# Patient Record
Sex: FEMALE | Race: BLACK OR AFRICAN AMERICAN | NOT HISPANIC OR LATINO | Employment: OTHER | URBAN - METROPOLITAN AREA
[De-identification: names, ages, dates, MRNs, and addresses within clinical notes are randomized per-mention and may not be internally consistent; named-entity substitution may affect disease eponyms.]

---

## 2017-10-03 ENCOUNTER — TELEPHONE (OUTPATIENT)
Dept: PRIMARY CARE CLINIC | Facility: CLINIC | Age: 62
End: 2017-10-03

## 2017-10-03 NOTE — TELEPHONE ENCOUNTER
Patient having eye surgery on 10/13, asking to come to office for clearance and to have a check up done. Appointment made for 10/9. No further issues discussed.

## 2017-10-03 NOTE — TELEPHONE ENCOUNTER
----- Message from Daphnie Ornelas sent at 10/3/2017 10:26 AM CDT -----  Contact: self [phone]  Patient needs an earlier appointment to see the doctor for clearance for eye surgery. Thanks!

## 2017-10-09 ENCOUNTER — OFFICE VISIT (OUTPATIENT)
Dept: PRIMARY CARE CLINIC | Facility: CLINIC | Age: 62
End: 2017-10-09
Payer: MEDICARE

## 2017-10-09 VITALS
HEIGHT: 62 IN | WEIGHT: 286.63 LBS | DIASTOLIC BLOOD PRESSURE: 75 MMHG | TEMPERATURE: 98 F | SYSTOLIC BLOOD PRESSURE: 146 MMHG | BODY MASS INDEX: 52.74 KG/M2 | HEART RATE: 63 BPM

## 2017-10-09 DIAGNOSIS — H11.009 PTERYGIUM, UNSPECIFIED LATERALITY: ICD-10-CM

## 2017-10-09 DIAGNOSIS — F32.A DEPRESSION, UNSPECIFIED DEPRESSION TYPE: ICD-10-CM

## 2017-10-09 DIAGNOSIS — F41.9 ANXIETY: ICD-10-CM

## 2017-10-09 DIAGNOSIS — K02.9 DENTAL CARIES: ICD-10-CM

## 2017-10-09 DIAGNOSIS — R29.898 WEAKNESS OF RIGHT ARM: ICD-10-CM

## 2017-10-09 DIAGNOSIS — G47.00 INSOMNIA, UNSPECIFIED TYPE: ICD-10-CM

## 2017-10-09 DIAGNOSIS — J45.909 ASTHMA, UNSPECIFIED ASTHMA SEVERITY, UNSPECIFIED WHETHER COMPLICATED, UNSPECIFIED WHETHER PERSISTENT: ICD-10-CM

## 2017-10-09 DIAGNOSIS — R03.0 BORDERLINE HYPERTENSION: ICD-10-CM

## 2017-10-09 DIAGNOSIS — E66.9 OBESITY, UNSPECIFIED CLASSIFICATION, UNSPECIFIED OBESITY TYPE, UNSPECIFIED WHETHER SERIOUS COMORBIDITY PRESENT: ICD-10-CM

## 2017-10-09 DIAGNOSIS — M19.90 OSTEOARTHRITIS, UNSPECIFIED OSTEOARTHRITIS TYPE, UNSPECIFIED SITE: ICD-10-CM

## 2017-10-09 DIAGNOSIS — D86.9 SARCOIDOSIS: ICD-10-CM

## 2017-10-09 DIAGNOSIS — L80 VITILIGO: ICD-10-CM

## 2017-10-09 DIAGNOSIS — H25.9 AGE-RELATED CATARACT OF BOTH EYES, UNSPECIFIED AGE-RELATED CATARACT TYPE: Primary | ICD-10-CM

## 2017-10-09 PROCEDURE — 99999 PR PBB SHADOW E&M-EST. PATIENT-LVL III: CPT | Mod: PBBFAC,,, | Performed by: FAMILY MEDICINE

## 2017-10-09 PROCEDURE — 99213 OFFICE O/P EST LOW 20 MIN: CPT | Mod: PBBFAC,PN | Performed by: FAMILY MEDICINE

## 2017-10-09 PROCEDURE — 99214 OFFICE O/P EST MOD 30 MIN: CPT | Mod: S$PBB,,, | Performed by: FAMILY MEDICINE

## 2017-10-09 RX ORDER — DICLOFENAC SODIUM 75 MG/1
75 TABLET, DELAYED RELEASE ORAL 2 TIMES DAILY
Qty: 60 TABLET | Refills: 2 | Status: SHIPPED | OUTPATIENT
Start: 2017-10-09 | End: 2018-08-10

## 2017-10-09 RX ORDER — ALBUTEROL SULFATE 90 UG/1
2 AEROSOL, METERED RESPIRATORY (INHALATION) EVERY 4 HOURS PRN
Qty: 1 INHALER | Refills: 5 | Status: SHIPPED | OUTPATIENT
Start: 2017-10-09 | End: 2017-11-20 | Stop reason: SDUPTHER

## 2017-10-09 RX ORDER — SERTRALINE HYDROCHLORIDE 25 MG/1
25 TABLET, FILM COATED ORAL DAILY
Qty: 30 TABLET | Refills: 11 | Status: SHIPPED | OUTPATIENT
Start: 2017-10-09 | End: 2019-09-03

## 2017-10-09 RX ORDER — ALBUTEROL SULFATE 0.83 MG/ML
2.5 SOLUTION RESPIRATORY (INHALATION) EVERY 6 HOURS PRN
Qty: 100 EACH | Refills: 5 | Status: SHIPPED | OUTPATIENT
Start: 2017-10-09 | End: 2017-11-20 | Stop reason: SDUPTHER

## 2017-10-09 RX ORDER — TRAZODONE HYDROCHLORIDE 50 MG/1
50 TABLET ORAL NIGHTLY
Qty: 30 TABLET | Refills: 11 | Status: SHIPPED | OUTPATIENT
Start: 2017-10-09 | End: 2019-09-03

## 2017-10-09 RX ORDER — HYDROCODONE BITARTRATE AND ACETAMINOPHEN 5; 325 MG/1; MG/1
TABLET ORAL
Qty: 30 TABLET | Refills: 0 | Status: SHIPPED | OUTPATIENT
Start: 2017-10-09 | End: 2017-11-20

## 2017-10-09 NOTE — PROGRESS NOTES
Subjective:       Patient ID: Dea Newton is a 62 y.o. female.    Chief Complaint: Procedure (clearance for surgery)    HPI: 63 yo BF fell broke her arm had surgery -- Dr [facility] -- surg was in March --still going to therapy-- frozen shoulder, fxed wrist, torn ligaments= still no use right hand unable make fist. Scar right lateral elbow        Needs clearance for cataract surgery      Anxiety-- can't sleep-- nerves bothering her--always in pain with shoulder, arthritis.  Feels drained and tired all the time   ROS:  Skin: no psoriasis, eczema, skin cancer  HEENT: No headache,+ cataracts- to do left eye first --needs clearance for surgery -no  ocular pain, blurred vision, diplopia, epistaxis, hoarseness change in voice, thyroid trouble  Lung: No pneumonia, +asthma no wheezing no inhalers ,no  Tb, wheezing, SOB, no smoking   Heart: No chest pain, ankle edema, palpitations, MI, rosendo murmur,+ borderline  hypertension, hyperlipidemia  Abdomen: No nausea, vomiting, diarrhea, constipation, ulcers, hepatitis, gallbladder disease, melena, hematochezia, hematemesis  : no UTI, renal disease, stones  GYN last PAP last mammogram been awhile.   MS: no fractures, O/A, lupus, rheumatoid, gout See HPI-- hx fx right arm + sarcoidosis   Neuro: No dizziness, LOC, seizure  No diabetes, no anemia, + anxiety, + depression   Single, 9 children one , work disabled due due sarcoidosis , lives with 4 grandchildren 14 , 12, 10, 8   Objective:   Physical Exam:  General: Well nourished, well developed, no acute distress + obese   Skin: Vitiligo face  HEENT: Eyes PERRLA, EOM intact, bilat cataracts and pterygium nose patent, throat non-erythematous + dentl caries   NECK: Supple, no bruits, No JVD, no nodes  Lungs: Clear, no rales, rhonchi, wheezing  Heart: Regular rate and rhythm, no murmurs, gallops, or rubs  Abdomen: flat, bowel sounds positive, no tenderness, or organomegaly  MS: waness right hand grasp, opposition  index ans thumb,   Neuro: Alert, CN intact, oriented X 3  Extremities: No cyanosis, clubbing, or edema         Assessment:       1. Age-related cataract of both eyes, unspecified age-related cataract type    2. Pterygium, unspecified laterality    3. Asthma, unspecified asthma severity, unspecified whether complicated, unspecified whether persistent    4. Weakness of right arm    5. Insomnia, unspecified type    6. Obesity, unspecified classification, unspecified obesity type, unspecified whether serious comorbidity present    7. Sarcoidosis    8. Osteoarthritis, unspecified osteoarthritis type, unspecified site    9. Anxiety    10. Depression, unspecified depression type    11. Vitiligo    12. Dental caries    13. Borderline hypertension        Plan:       Age-related cataract of both eyes, unspecified age-related cataract type    Pterygium, unspecified laterality    Asthma, unspecified asthma severity, unspecified whether complicated, unspecified whether persistent    Weakness of right arm    Insomnia, unspecified type    Obesity, unspecified classification, unspecified obesity type, unspecified whether serious comorbidity present  -     Lipid panel; Future; Expected date: 10/09/2017    Sarcoidosis  -     TSH; Future; Expected date: 10/09/2017  -     Sedimentation rate, manual; Future; Expected date: 10/09/2017    Osteoarthritis, unspecified osteoarthritis type, unspecified site    Anxiety  -     TSH; Future; Expected date: 10/09/2017    Depression, unspecified depression type    Vitiligo  -     TSH; Future; Expected date: 10/09/2017    Dental caries    Borderline hypertension  -     CBC auto differential; Future; Expected date: 10/09/2017  -     Comprehensive metabolic panel; Future; Expected date: 10/09/2017  -     Lipid panel; Future; Expected date: 10/09/2017    Other orders  -     hydrocodone-acetaminophen 5-325mg (NORCO) 5-325 mg per tablet; 1/2 po q 6 hrs or 1 po bid prn pain  Dispense: 30 tablet; Refill:  0  -     diclofenac (VOLTAREN) 75 MG EC tablet; Take 1 tablet (75 mg total) by mouth 2 (two) times daily.  Dispense: 60 tablet; Refill: 2  -     trazodone (DESYREL) 50 MG tablet; Take 1 tablet (50 mg total) by mouth every evening.  Dispense: 30 tablet; Refill: 11  -     sertraline (ZOLOFT) 25 MG tablet; Take 1 tablet (25 mg total) by mouth once daily.  Dispense: 30 tablet; Refill: 11

## 2017-10-09 NOTE — PATIENT INSTRUCTIONS
Medically cleared for surgery   Low NA diet  [facility]  Zoloft 25 mg 1 po qd, tranxene 7.5 scored 1/2 po bid prn anxiety   Keep appt orth  for surgery right arm  Insomnia Desyrel 50 1 po q HS prn sleep. Get chest Xray and EKG reports from surgery March right arm.

## 2017-11-20 ENCOUNTER — OFFICE VISIT (OUTPATIENT)
Dept: PRIMARY CARE CLINIC | Facility: CLINIC | Age: 62
End: 2017-11-20
Payer: MEDICARE

## 2017-11-20 VITALS
HEIGHT: 60 IN | DIASTOLIC BLOOD PRESSURE: 73 MMHG | TEMPERATURE: 99 F | WEIGHT: 288 LBS | SYSTOLIC BLOOD PRESSURE: 160 MMHG | OXYGEN SATURATION: 100 % | HEART RATE: 56 BPM | RESPIRATION RATE: 18 BRPM | BODY MASS INDEX: 56.54 KG/M2

## 2017-11-20 DIAGNOSIS — L80 VITILIGO: ICD-10-CM

## 2017-11-20 DIAGNOSIS — H57.12 LEFT EYE PAIN: ICD-10-CM

## 2017-11-20 DIAGNOSIS — M19.90 OSTEOARTHRITIS, UNSPECIFIED OSTEOARTHRITIS TYPE, UNSPECIFIED SITE: ICD-10-CM

## 2017-11-20 DIAGNOSIS — F32.A DEPRESSION, UNSPECIFIED DEPRESSION TYPE: ICD-10-CM

## 2017-11-20 DIAGNOSIS — J40 BRONCHITIS: Primary | ICD-10-CM

## 2017-11-20 DIAGNOSIS — E66.9 OBESITY, UNSPECIFIED CLASSIFICATION, UNSPECIFIED OBESITY TYPE, UNSPECIFIED WHETHER SERIOUS COMORBIDITY PRESENT: ICD-10-CM

## 2017-11-20 DIAGNOSIS — G47.00 INSOMNIA, UNSPECIFIED TYPE: ICD-10-CM

## 2017-11-20 DIAGNOSIS — D86.9 SARCOIDOSIS: ICD-10-CM

## 2017-11-20 DIAGNOSIS — F41.9 ANXIETY: ICD-10-CM

## 2017-11-20 DIAGNOSIS — J45.909 ASTHMA, UNSPECIFIED ASTHMA SEVERITY, UNSPECIFIED WHETHER COMPLICATED, UNSPECIFIED WHETHER PERSISTENT: ICD-10-CM

## 2017-11-20 DIAGNOSIS — K02.9 DENTAL CARIES: ICD-10-CM

## 2017-11-20 PROCEDURE — 99214 OFFICE O/P EST MOD 30 MIN: CPT | Mod: S$PBB,,, | Performed by: FAMILY MEDICINE

## 2017-11-20 PROCEDURE — 96372 THER/PROPH/DIAG INJ SC/IM: CPT | Mod: PBBFAC,PN

## 2017-11-20 PROCEDURE — 99214 OFFICE O/P EST MOD 30 MIN: CPT | Mod: PBBFAC,PN,25 | Performed by: FAMILY MEDICINE

## 2017-11-20 PROCEDURE — 99999 PR PBB SHADOW E&M-EST. PATIENT-LVL IV: CPT | Mod: PBBFAC,,, | Performed by: FAMILY MEDICINE

## 2017-11-20 RX ORDER — HYDROCODONE BITARTRATE AND ACETAMINOPHEN 5; 325 MG/1; MG/1
TABLET ORAL
Qty: 30 TABLET | Refills: 0 | Status: SHIPPED | OUTPATIENT
Start: 2017-11-20 | End: 2018-01-29 | Stop reason: SDUPTHER

## 2017-11-20 RX ORDER — ALBUTEROL SULFATE 90 UG/1
2 AEROSOL, METERED RESPIRATORY (INHALATION) EVERY 4 HOURS PRN
Qty: 1 INHALER | Refills: 5 | Status: SHIPPED | OUTPATIENT
Start: 2017-11-20 | End: 2018-03-07

## 2017-11-20 RX ORDER — PREDNISONE 5 MG/1
TABLET ORAL
Qty: 20 TABLET | Refills: 0 | Status: SHIPPED | OUTPATIENT
Start: 2017-11-20 | End: 2018-01-29

## 2017-11-20 RX ORDER — ALBUTEROL SULFATE 0.83 MG/ML
2.5 SOLUTION RESPIRATORY (INHALATION) EVERY 6 HOURS PRN
Qty: 100 EACH | Refills: 5 | Status: SHIPPED | OUTPATIENT
Start: 2017-11-20 | End: 2018-03-07 | Stop reason: SDUPTHER

## 2017-11-20 RX ORDER — BETAMETHASONE SODIUM PHOSPHATE AND BETAMETHASONE ACETATE 3; 3 MG/ML; MG/ML
12 INJECTION, SUSPENSION INTRA-ARTICULAR; INTRALESIONAL; INTRAMUSCULAR; SOFT TISSUE
Status: COMPLETED | OUTPATIENT
Start: 2017-11-20 | End: 2017-11-20

## 2017-11-20 RX ORDER — PROMETHAZINE HYDROCHLORIDE AND CODEINE PHOSPHATE 6.25; 1 MG/5ML; MG/5ML
5 SOLUTION ORAL EVERY 6 HOURS PRN
Qty: 180 ML | Refills: 0 | Status: SHIPPED | OUTPATIENT
Start: 2017-11-20 | End: 2018-01-29

## 2017-11-20 RX ORDER — AZITHROMYCIN 250 MG/1
TABLET, FILM COATED ORAL
Qty: 6 TABLET | Refills: 0 | Status: SHIPPED | OUTPATIENT
Start: 2017-11-20 | End: 2017-11-24

## 2017-11-20 RX ADMIN — BETAMETHASONE ACETATE AND BETAMETHASONE SODIUM PHOSPHATE 12 MG: 3; 3 INJECTION, SUSPENSION INTRA-ARTICULAR; INTRALESIONAL; INTRAMUSCULAR; SOFT TISSUE at 12:11

## 2017-11-20 NOTE — PROGRESS NOTES
Subjective:       Patient ID: Dea Newton is a 62 y.o. female.    Chief Complaint: Follow-up and Medication Refill (inhaler )    HPI: 62-year-old black female complaining of left eye pain --- seen by Dr Schoenburger--3 days ago given some antibiotics .patient had cataract surgery by Dr. Titus 11/17/70   Complaining of a cold --started last Monday ×7 day --no fever , no runny nose , no sore throat , just a dry cough --no phlegm .+ asthma and has albuterol inhaler and Advair Diskus --positive wheezing .no smoke   ROS:  Skin: no psoriasis, eczema, skin cancer + vitiligo  HEENT:+ headache sinus area,no  ocular pain, blurred vision, diplopia, epistaxis, hoarseness change in voice, thyroid trouble  Lung: See history of present illness  Heart: No chest pain, ankle edema, +palpitations, no  MI, rosendo murmur, hypertension, hyperlipidemia  Abdomen: No nausea, vomiting, diarrhea, constipation, ulcers, hepatitis, gallbladder disease, melena, hematochezia, hematemesis  : no UTI, renal disease, stones  GYN no mammogram and Pap smear  MS: no fractures, +O/A,no  lupus, rheumatoid, gout--history of sarcoidosis problems with lungs and knees  Neuro: No dizziness, LOC, seizures   No diabetes, no anemia, +anxiety, + depression unable to do it used to do  Single, raising 4 grandchildren unemployed, lives alone with 4 grandchildren       Objective:   Physical Exam:  General: Well nourished, well developed, no acute distress  Skin: No lesions  HEENT: Eyes PERRLA, EOM intact slight redness to the left eye medially bilateral pterygiums no significant pain on palpation, nose clear discharge, throat +erythematous   NECK: Supple, no bruits, No JVD, no nodes  Lungs: Clear, no rales, rhonchi, wheezing  Heart: Regular rate and rhythm, no murmurs, gallops, or rubs+ Coarse cough   Abdomen: flat, bowel sounds positive, no tenderness, or organomegaly  MS: Range of motion and muscle strength intact  Neuro: Alert, CN intact, oriented X  3  Extremities: No cyanosis, clubbing, or edema         Assessment:       1. Bronchitis    2. Anxiety    3. Depression, unspecified depression type    4. Dental caries    5. Vitiligo    6. Asthma, unspecified asthma severity, unspecified whether complicated, unspecified whether persistent    7. Sarcoidosis    8. Obesity, unspecified classification, unspecified obesity type, unspecified whether serious comorbidity present    9. Osteoarthritis, unspecified osteoarthritis type, unspecified site    10. Insomnia, unspecified type    11. Left eye pain        Plan:       Bronchitis    Anxiety    Depression, unspecified depression type    Dental caries    Vitiligo    Asthma, unspecified asthma severity, unspecified whether complicated, unspecified whether persistent    Sarcoidosis    Obesity, unspecified classification, unspecified obesity type, unspecified whether serious comorbidity present    Osteoarthritis, unspecified osteoarthritis type, unspecified site    Insomnia, unspecified type    Left eye pain  -     Ambulatory Referral to Ophthalmology    Other orders  -     albuterol (PROVENTIL) 2.5 mg /3 mL (0.083 %) nebulizer solution; Take 3 mLs (2.5 mg total) by nebulization every 6 (six) hours as needed for Wheezing. Rescue  Dispense: 100 each; Refill: 5  -     albuterol 90 mcg/actuation inhaler; Inhale 2 puffs into the lungs every 4 (four) hours as needed for Wheezing or Shortness of Breath. Rescue  Dispense: 1 Inhaler; Refill: 5  -     hydrocodone-acetaminophen 5-325mg (NORCO) 5-325 mg per tablet; 1/2 po q 6 hrs or 1 po bid prn pain  Dispense: 30 tablet; Refill: 0  -     predniSONE (DELTASONE) 5 MG tablet; 4 po qd x 2, 3 po qd x2, 2 po qd x2, 1 po qd x2  Dispense: 20 tablet; Refill: 0  -     azithromycin (Z-RAJINDER) 250 MG tablet; 2 tabs by mouth day 1, then 1 tab by mouth daily x 4 days  Dispense: 6 tablet; Refill: 0  -     promethazine-codeine 6.25-10 mg/5 ml (PHENERGAN WITH CODEINE) 6.25-10 mg/5 mL syrup; Take 5 mLs by  mouth every 6 (six) hours as needed for Cough.  Dispense: 180 mL; Refill: 0  -     betamethasone acetate-betamethasone sodium phosphate injection 12 mg; Inject 2 mLs (12 mg total) into the muscle one time.

## 2018-01-29 ENCOUNTER — OFFICE VISIT (OUTPATIENT)
Dept: PRIMARY CARE CLINIC | Facility: CLINIC | Age: 63
End: 2018-01-29
Payer: MEDICARE

## 2018-01-29 VITALS
RESPIRATION RATE: 18 BRPM | HEIGHT: 61 IN | HEART RATE: 57 BPM | TEMPERATURE: 99 F | DIASTOLIC BLOOD PRESSURE: 64 MMHG | BODY MASS INDEX: 54.19 KG/M2 | WEIGHT: 287 LBS | SYSTOLIC BLOOD PRESSURE: 99 MMHG | OXYGEN SATURATION: 100 %

## 2018-01-29 DIAGNOSIS — J45.909 ASTHMA, UNSPECIFIED ASTHMA SEVERITY, UNSPECIFIED WHETHER COMPLICATED, UNSPECIFIED WHETHER PERSISTENT: ICD-10-CM

## 2018-01-29 DIAGNOSIS — F32.A DEPRESSION, UNSPECIFIED DEPRESSION TYPE: ICD-10-CM

## 2018-01-29 DIAGNOSIS — M19.90 OSTEOARTHRITIS, UNSPECIFIED OSTEOARTHRITIS TYPE, UNSPECIFIED SITE: ICD-10-CM

## 2018-01-29 DIAGNOSIS — D86.9 SARCOIDOSIS: Primary | ICD-10-CM

## 2018-01-29 DIAGNOSIS — L80 VITILIGO: ICD-10-CM

## 2018-01-29 DIAGNOSIS — F41.9 ANXIETY: ICD-10-CM

## 2018-01-29 DIAGNOSIS — K02.9 DENTAL CARIES: ICD-10-CM

## 2018-01-29 DIAGNOSIS — E66.9 OBESITY, UNSPECIFIED CLASSIFICATION, UNSPECIFIED OBESITY TYPE, UNSPECIFIED WHETHER SERIOUS COMORBIDITY PRESENT: ICD-10-CM

## 2018-01-29 LAB
CTP QC/QA: YES
FLUAV AG NPH QL: NEGATIVE
FLUBV AG NPH QL: NEGATIVE

## 2018-01-29 PROCEDURE — 99213 OFFICE O/P EST LOW 20 MIN: CPT | Mod: S$PBB,,, | Performed by: FAMILY MEDICINE

## 2018-01-29 PROCEDURE — 87804 INFLUENZA ASSAY W/OPTIC: CPT | Mod: PBBFAC,PN | Performed by: FAMILY MEDICINE

## 2018-01-29 PROCEDURE — 99213 OFFICE O/P EST LOW 20 MIN: CPT | Mod: PBBFAC,PN | Performed by: FAMILY MEDICINE

## 2018-01-29 PROCEDURE — 99999 PR PBB SHADOW E&M-EST. PATIENT-LVL III: CPT | Mod: PBBFAC,,, | Performed by: FAMILY MEDICINE

## 2018-01-29 RX ORDER — LEVOFLOXACIN 500 MG/1
500 TABLET, FILM COATED ORAL DAILY
Qty: 10 TABLET | Refills: 0 | Status: SHIPPED | OUTPATIENT
Start: 2018-01-29 | End: 2018-02-08

## 2018-01-29 RX ORDER — HYDROCODONE BITARTRATE AND ACETAMINOPHEN 5; 325 MG/1; MG/1
TABLET ORAL
Qty: 30 TABLET | Refills: 0 | Status: SHIPPED | OUTPATIENT
Start: 2018-01-29 | End: 2018-04-20 | Stop reason: SDUPTHER

## 2018-01-29 RX ORDER — METHYLPREDNISOLONE 4 MG/1
TABLET ORAL
Qty: 1 PACKAGE | Refills: 0 | Status: SHIPPED | OUTPATIENT
Start: 2018-01-29 | End: 2018-02-19

## 2018-01-29 RX ORDER — PROMETHAZINE HYDROCHLORIDE AND CODEINE PHOSPHATE 6.25; 1 MG/5ML; MG/5ML
5 SOLUTION ORAL EVERY 6 HOURS PRN
Qty: 180 ML | Refills: 0 | Status: SHIPPED | OUTPATIENT
Start: 2018-01-29 | End: 2018-04-20

## 2018-01-29 NOTE — PROGRESS NOTES
Subjective:       Patient ID: Dea Newton is a 62 y.o. female.    Chief Complaint: Generalized Body Aches (headaches SOB weak )    HPI: 62-year-old black female sick for the past 5 days body aches headaches shortness of breath and weak--no fever, + runny nose , no sore throat, + cough, no phlegm, + wheezing has inhaler.  No history of pneumonia or tuberculosis.  + Asthma only has to use the inhaler walks a lot.  No one else sick at home    ROS:  Skin: no psoriasis, eczema, skin cancer + vitiligo  HEENT: + headache-over the bridge of the nose mid chavez-orbital areas bilaterally,no  ocular pain, blurred vision, diplopia, epistaxis, hoarseness change in voice, thyroid trouble  Lung: No pneumonia, +asthma,no  Tb, +wheezing, +SOB, no smoking + sarcoid   Heart: No chest pain, ankle edema, palpitations, MI, +rosendo murmur, no  hypertension, hyperlipidemia  Abdomen: No nausea, vomiting, diarrhea, constipation, ulcers, hepatitis, gallbladder disease, melena, hematochezia, hematemesis  : no UTI, renal disease, stones  MS: no fractures, O/A, lupus, rheumatoid, gout  Neuro: No dizziness, LOC, seizures   No diabetes, no anemia, + anxiety, + depression--financial problems no way to pay bills     Objective:   Physical Exam:  General: Well nourished, well developed, no acute distress + obese   Skin: + The leg  HEENT: Eyes PERRLA, EOM intact, nose clear discharge, throat +erythematous 1/4-dental caries ears TM clear fluid   NECK: Supple, no bruits, No JVD, no nodes  Lungs: Clear, no rales, rhonchi, wheezing + coarse cough but no rales or wheezing at this time  Heart: Regular rate and rhythm, no murmurs, gallops, or rubs  Abdomen: flat, bowel sounds positive, no tenderness, or organomegaly  MS: Range of motion and muscle strength intact  Neuro: Alert, CN intact, oriented X 3  Extremities: No cyanosis, clubbing, or edema         Assessment:       1. Sarcoidosis    2. Dental caries    3. Vitiligo    4. Asthma, unspecified asthma  severity, unspecified whether complicated, unspecified whether persistent    5. Obesity, unspecified classification, unspecified obesity type, unspecified whether serious comorbidity present    6. Depression, unspecified depression type    7. Anxiety    8. Osteoarthritis, unspecified osteoarthritis type, unspecified site        Plan:       Sarcoidosis    Dental caries    Vitiligo    Asthma, unspecified asthma severity, unspecified whether complicated, unspecified whether persistent    Obesity, unspecified classification, unspecified obesity type, unspecified whether serious comorbidity present    Depression, unspecified depression type    Anxiety    Osteoarthritis, unspecified osteoarthritis type, unspecified site    Other orders  -     hydrocodone-acetaminophen 5-325mg (NORCO) 5-325 mg per tablet; 1/2 po q 6 hrs or 1 po bid prn pain  Dispense: 30 tablet; Refill: 0      November patient was treated with Zithromax Celestone prednisone Phenergan with codeine  Has cold again treat with Levaquin/Medrol/Phenergan With Codeine no steroids for at least 3 months  Flu swab

## 2018-03-07 RX ORDER — ALBUTEROL SULFATE 0.83 MG/ML
2.5 SOLUTION RESPIRATORY (INHALATION) EVERY 6 HOURS PRN
Qty: 100 EACH | Refills: 5 | OUTPATIENT
Start: 2018-03-07 | End: 2018-04-20 | Stop reason: SDUPTHER

## 2018-03-07 NOTE — TELEPHONE ENCOUNTER
----- Message from Soraya Bearden sent at 3/6/2018  1:03 PM CST -----  Contact: self   Patient need a refill on albuterol and rx for wheel chair please send to [facility], any questions please call back at [phone] (home)     [facility] [id number]  [place] - [address] AT [facility] [address] [facility] [address]  [address] [place] [address]  Phone: [phone] Fax: [phone]

## 2018-04-20 ENCOUNTER — TELEPHONE (OUTPATIENT)
Dept: PRIMARY CARE CLINIC | Facility: CLINIC | Age: 63
End: 2018-04-20

## 2018-04-20 ENCOUNTER — OFFICE VISIT (OUTPATIENT)
Dept: PRIMARY CARE CLINIC | Facility: CLINIC | Age: 63
End: 2018-04-20
Payer: MEDICARE

## 2018-04-20 VITALS
RESPIRATION RATE: 18 BRPM | WEIGHT: 287.13 LBS | OXYGEN SATURATION: 100 % | DIASTOLIC BLOOD PRESSURE: 91 MMHG | HEART RATE: 68 BPM | HEIGHT: 61 IN | TEMPERATURE: 98 F | SYSTOLIC BLOOD PRESSURE: 190 MMHG | BODY MASS INDEX: 54.21 KG/M2

## 2018-04-20 DIAGNOSIS — K02.9 DENTAL CARIES: ICD-10-CM

## 2018-04-20 DIAGNOSIS — D86.9 SARCOIDOSIS: ICD-10-CM

## 2018-04-20 DIAGNOSIS — F41.9 ANXIETY: ICD-10-CM

## 2018-04-20 DIAGNOSIS — H25.9 SENILE CATARACT, UNSPECIFIED AGE-RELATED CATARACT TYPE, UNSPECIFIED LATERALITY: ICD-10-CM

## 2018-04-20 DIAGNOSIS — E66.9 OBESITY, UNSPECIFIED CLASSIFICATION, UNSPECIFIED OBESITY TYPE, UNSPECIFIED WHETHER SERIOUS COMORBIDITY PRESENT: ICD-10-CM

## 2018-04-20 DIAGNOSIS — L80 VITILIGO: ICD-10-CM

## 2018-04-20 DIAGNOSIS — F32.A DEPRESSION, UNSPECIFIED DEPRESSION TYPE: ICD-10-CM

## 2018-04-20 DIAGNOSIS — M19.90 OSTEOARTHRITIS, UNSPECIFIED OSTEOARTHRITIS TYPE, UNSPECIFIED SITE: ICD-10-CM

## 2018-04-20 DIAGNOSIS — J45.909 ASTHMA, UNSPECIFIED ASTHMA SEVERITY, UNSPECIFIED WHETHER COMPLICATED, UNSPECIFIED WHETHER PERSISTENT: ICD-10-CM

## 2018-04-20 DIAGNOSIS — J40 BRONCHITIS: Primary | ICD-10-CM

## 2018-04-20 PROCEDURE — 99999 PR PBB SHADOW E&M-EST. PATIENT-LVL III: CPT | Mod: PBBFAC,,, | Performed by: FAMILY MEDICINE

## 2018-04-20 PROCEDURE — 99213 OFFICE O/P EST LOW 20 MIN: CPT | Mod: PBBFAC,PN | Performed by: FAMILY MEDICINE

## 2018-04-20 PROCEDURE — 99213 OFFICE O/P EST LOW 20 MIN: CPT | Mod: S$PBB,,, | Performed by: FAMILY MEDICINE

## 2018-04-20 RX ORDER — ALBUTEROL SULFATE 0.83 MG/ML
2.5 SOLUTION RESPIRATORY (INHALATION) EVERY 6 HOURS PRN
Qty: 100 EACH | Refills: 5 | Status: SHIPPED | OUTPATIENT
Start: 2018-04-20 | End: 2018-08-10 | Stop reason: SDUPTHER

## 2018-04-20 RX ORDER — METHYLPREDNISOLONE 4 MG/1
TABLET ORAL
Qty: 1 PACKAGE | Refills: 0 | Status: SHIPPED | OUTPATIENT
Start: 2018-04-20 | End: 2018-05-11

## 2018-04-20 RX ORDER — AZITHROMYCIN 250 MG/1
TABLET, FILM COATED ORAL
Qty: 6 TABLET | Refills: 0 | Status: SHIPPED | OUTPATIENT
Start: 2018-04-20 | End: 2018-04-24

## 2018-04-20 RX ORDER — HYDROCODONE BITARTRATE AND ACETAMINOPHEN 5; 325 MG/1; MG/1
TABLET ORAL
Qty: 30 TABLET | Refills: 0 | Status: SHIPPED | OUTPATIENT
Start: 2018-04-20 | End: 2018-07-09 | Stop reason: SDUPTHER

## 2018-04-20 RX ORDER — PROMETHAZINE HYDROCHLORIDE AND CODEINE PHOSPHATE 6.25; 1 MG/5ML; MG/5ML
5 SOLUTION ORAL EVERY 6 HOURS PRN
Qty: 180 ML | Refills: 0 | Status: SHIPPED | OUTPATIENT
Start: 2018-04-20 | End: 2018-07-09

## 2018-04-20 NOTE — PROGRESS NOTES
Subjective:       Patient ID: Dea Newton is a 62 y.o. female.    Chief Complaint: Sore Throat; Cough; Headache; and Medication Refill (albuterol solution for nebulizer, norco and order for walker with seat)    HPI: 62-year-old female in for cold--no fever, no runny nose, + sore throat, + cough, + phlegm green--clear to.  + History of asthma no pneumonia or TB.  Has nebulizer albuterol, and albuterol-Ventolin inhaler.  Uses Ventolin 2-3 times per day        Impression concussion T to have a family member lives with her--- patient lives alone--on section 8 program.     ROS:  Skin: no psoriasis, eczema, skin cancer + vitiligo  HEENT: + headache,no  ocular pain, blurred vision, diplopia, epistaxis, hoarseness change in voice, thyroid trouble + dental caries is getting them repair, had left cataract removed still has right cataract to be removed  Lung: No pneumonia,Tb, wheezing, SOB, + asthma, + sarcoidosis  Heart: No chest pain, ankle edema, +palpitations no , MI, rosendo murmur, hypertension, hyperlipidemia  Abdomen: No nausea, vomiting, diarrhea, constipation, ulcers, hepatitis, gallbladder disease, melena, hematochezia, hematemesis  : no UTI, renal disease, stones  GYN negative  MS: no fractures, +O/A- knees bilaterally, ankles bilaterally, lumbar spine,no  lupus, rheumatoid, gout--fractured right arm last year had surgery   Neuro: No dizziness, LOC, seizures   No diabetes, no anemia, no anxiety, + depression  ,, 8 children, lives alone raising her 4 grandkids--patient has crusted and 4 grandchildren so able to stay with but needs permission if the person is not on her least to have another adult stay with her     Objective:   Physical Exam:  General: Well nourished, well developed, no acute distress + obese   Skin: + Vitiligo  HEENT: Eyes PERRLA, EOM intact, nose + clear discharge, throat +1/4 erythematous--poor dentition multiple voids ears TM clear  NECK: Supple, no bruits, No JVD, no nodes  Lungs:  Clear, no rales, rhonchi, wheezing + coarse cough  Heart: Regular rate and rhythm, no murmurs, gallops, or rubs  Abdomen: flat, bowel sounds positive, no tenderness, or organomegaly  MS: Range of motion and muscle strength intact  Neuro: Alert, CN intact, oriented X 3  Extremities: No cyanosis, clubbing, or edema         Assessment:       1. Bronchitis    2. Vitiligo    3. Asthma, unspecified asthma severity, unspecified whether complicated, unspecified whether persistent    4. Sarcoidosis    5. Obesity, unspecified classification, unspecified obesity type, unspecified whether serious comorbidity present    6. Osteoarthritis, unspecified osteoarthritis type, unspecified site    7. Dental caries    8. Depression, unspecified depression type    9. Anxiety    10. Senile cataract, unspecified age-related cataract type, unspecified laterality        Plan:       Bronchitis    Vitiligo    Asthma, unspecified asthma severity, unspecified whether complicated, unspecified whether persistent    Sarcoidosis    Obesity, unspecified classification, unspecified obesity type, unspecified whether serious comorbidity present    Osteoarthritis, unspecified osteoarthritis type, unspecified site    Dental caries    Depression, unspecified depression type    Anxiety    Senile cataract, unspecified age-related cataract type, unspecified laterality    Other orders  -     hydrocodone-acetaminophen 5-325mg (NORCO) 5-325 mg per tablet; 1/2 po q 6 hrs or 1 po bid prn pain  Dispense: 30 tablet; Refill: 0  -     albuterol (PROVENTIL) 2.5 mg /3 mL (0.083 %) nebulizer solution; Take 3 mLs (2.5 mg total) by nebulization every 6 (six) hours as needed for Wheezing. Rescue  Dispense: 100 each; Refill: 5  -     promethazine-codeine 6.25-10 mg/5 ml (PHENERGAN WITH CODEINE) 6.25-10 mg/5 mL syrup; Take 5 mLs by mouth every 6 (six) hours as needed for Cough.  Dispense: 180 mL; Refill: 0  -     azithromycin (Z-RAJINDER) 250 MG tablet; 2 tabs by mouth day 1, then  1 tab by mouth daily x 4 days  Dispense: 6 tablet; Refill: 0  -     methylPREDNISolone (MEDROL DOSEPACK) 4 mg tablet; use as directed  Dispense: 1 Package; Refill: 0      Zithromax/Medrol/Phenergan With Codeine for cold  Needs routine labs as outlined in the computer also needs chest x-ray EKG  Given note needs some assistance with her living due to obesity/osteoarthritis/asthma/sarcoid  Patient wants a walker with a seat  Needs refill of albuterol inhaler and aerosol nebulizer-solution

## 2018-04-20 NOTE — TELEPHONE ENCOUNTER
----- Message from Haritha Crespo sent at 4/20/2018  9:06 AM CDT -----  Type:  Sooner Apoointment Request       Name of Caller:  Dea  When is the first available appointment?  Today at 12:15   Symptoms:  Headache  Best Call Back Number:  [phone] (home)     Additional Information:  states she will come in now and wait

## 2018-05-16 ENCOUNTER — TELEPHONE (OUTPATIENT)
Dept: PRIMARY CARE CLINIC | Facility: CLINIC | Age: 63
End: 2018-05-16

## 2018-05-16 NOTE — TELEPHONE ENCOUNTER
----- Message from Haritha Kelsey sent at 5/16/2018 10:35 AM CDT -----  Contact: Patient  Type: Needs Medical Advice    Who Called:  Dea patient   Symptoms (please be specific):  N/A  How long has patient had these symptoms:  N/A  Pharmacy name and phone #:  N/A  Best Call Back Number: [phone]  Additional Information: Calling to inquire about [employer]. Please call her. Thanks.

## 2018-07-02 ENCOUNTER — NURSE TRIAGE (OUTPATIENT)
Dept: ADMINISTRATIVE | Facility: CLINIC | Age: 63
End: 2018-07-02

## 2018-07-02 ENCOUNTER — TELEPHONE (OUTPATIENT)
Dept: PRIMARY CARE CLINIC | Facility: CLINIC | Age: 63
End: 2018-07-02

## 2018-07-02 NOTE — TELEPHONE ENCOUNTER
----- Message from Nimisha Williamson sent at 7/2/2018 10:15 AM CDT -----  Contact: Patient  Dea, [phone] called to schedule an appointment for headache, weakness, shortness of breath, and high blood pressure. Advised patient I cannot schedule for those symptoms, transferred to [facility] on call nurse. Please advise. Thanks.

## 2018-07-09 ENCOUNTER — OFFICE VISIT (OUTPATIENT)
Dept: PRIMARY CARE CLINIC | Facility: CLINIC | Age: 63
End: 2018-07-09
Payer: MEDICARE

## 2018-07-09 VITALS
OXYGEN SATURATION: 100 % | RESPIRATION RATE: 18 BRPM | TEMPERATURE: 98 F | HEART RATE: 55 BPM | SYSTOLIC BLOOD PRESSURE: 162 MMHG | BODY MASS INDEX: 54.75 KG/M2 | WEIGHT: 290 LBS | HEIGHT: 61 IN | DIASTOLIC BLOOD PRESSURE: 71 MMHG

## 2018-07-09 DIAGNOSIS — F32.A DEPRESSION, UNSPECIFIED DEPRESSION TYPE: ICD-10-CM

## 2018-07-09 DIAGNOSIS — R60.0 PERIPHERAL EDEMA: Primary | ICD-10-CM

## 2018-07-09 DIAGNOSIS — J45.909 ASTHMA, UNSPECIFIED ASTHMA SEVERITY, UNSPECIFIED WHETHER COMPLICATED, UNSPECIFIED WHETHER PERSISTENT: ICD-10-CM

## 2018-07-09 DIAGNOSIS — D86.9 SARCOIDOSIS: ICD-10-CM

## 2018-07-09 DIAGNOSIS — L80 VITILIGO: ICD-10-CM

## 2018-07-09 DIAGNOSIS — I10 ESSENTIAL HYPERTENSION: ICD-10-CM

## 2018-07-09 DIAGNOSIS — M19.90 OSTEOARTHRITIS, UNSPECIFIED OSTEOARTHRITIS TYPE, UNSPECIFIED SITE: ICD-10-CM

## 2018-07-09 DIAGNOSIS — F41.9 ANXIETY: ICD-10-CM

## 2018-07-09 DIAGNOSIS — R29.898 WEAKNESS OF RIGHT ARM: ICD-10-CM

## 2018-07-09 DIAGNOSIS — G47.00 INSOMNIA, UNSPECIFIED TYPE: ICD-10-CM

## 2018-07-09 DIAGNOSIS — K02.9 DENTAL CARIES: ICD-10-CM

## 2018-07-09 DIAGNOSIS — E66.9 OBESITY, UNSPECIFIED CLASSIFICATION, UNSPECIFIED OBESITY TYPE, UNSPECIFIED WHETHER SERIOUS COMORBIDITY PRESENT: ICD-10-CM

## 2018-07-09 PROCEDURE — 99999 PR PBB SHADOW E&M-EST. PATIENT-LVL IV: CPT | Mod: PBBFAC,,, | Performed by: FAMILY MEDICINE

## 2018-07-09 PROCEDURE — 99214 OFFICE O/P EST MOD 30 MIN: CPT | Mod: S$PBB,ICN,, | Performed by: FAMILY MEDICINE

## 2018-07-09 PROCEDURE — 99214 OFFICE O/P EST MOD 30 MIN: CPT | Mod: PBBFAC,PN | Performed by: FAMILY MEDICINE

## 2018-07-09 RX ORDER — FUROSEMIDE 40 MG/1
40 TABLET ORAL DAILY
Qty: 30 TABLET | Refills: 5 | Status: SHIPPED | OUTPATIENT
Start: 2018-07-09 | End: 2018-08-10 | Stop reason: SDUPTHER

## 2018-07-09 RX ORDER — HYDROCODONE BITARTRATE AND ACETAMINOPHEN 5; 325 MG/1; MG/1
TABLET ORAL
Qty: 30 TABLET | Refills: 0 | Status: SHIPPED | OUTPATIENT
Start: 2018-07-09 | End: 2018-08-10

## 2018-07-09 NOTE — PROGRESS NOTES
Subjective:       Patient ID: Dea Newton is a 62 y.o. female.    Chief Complaint: Headache (bodyaches dizzy swelling )    HPI: 62-year-old female playing of headaches dizziness swelling and body aches.  Patient went to the emergency room 2-3 weeks ago--patient was given pressure medications patient supposed to be on 2 blood pressure pills amlodipine and hydrochlorothiazide--blood pressure 173/75 repeated 162/71--but patient out of amlodipine--ran out a few days ago.       Headaches in the frontal area--daughter feels secondary to tension patient is moving.  Patient had to move out of her house and does not have another how she does needs papers filled out--lower living in.  Patient states needs a limited because not able to do anything for self--- patient fractured her right humerus and has a frozen shoulder so unable to clean sweep mop.  Also feeling dizzy feels probably secondary to being out of blood pressure medications    ROS:  Skin: no psoriasis, eczema, skin cancer + vitiligo  HEENT: + headache-see history of present illness,no  ocular pain, blurred vision, diplopia, epistaxis, hoarseness change in voice, thyroid trouble + dental caries --states no medication to repair teeth , had left cataract removed still has right cataract to be removed  Lung: No pneumonia,Tb, wheezing, SOB, + asthma, + sarcoidosis--does get some shortness of breath  Heart: No chest pain,+ ankle edema, +palpitations no MI, rosendo murmur, hypertension, hyperlipidemia  Abdomen: No nausea, vomiting, diarrhea, constipation, ulcers, hepatitis, gallbladder disease, melena, hematochezia, hematemesis  : no UTI, renal disease, stones  GYN negative  MS: no fractures, +O/A- knees bilaterally, ankles bilaterally, history of a fractured right humerus with frozen shoulders hard to abduct the arm had surgery unable to abduct arm  more than 70° unable raise arms overhead lumbar spine,no  lupus, rheumatoid, gout  Neuro: + dizziness, LOC, seizures   No  diabetes, no anemia, + anxiety, + depression  ,, 8 children, lives alone raising her 4 grandkids--patient has   4 grandchildren so able to stay with but needs permission if the person is not on her list  to have another adult stay with her     Objective:   Physical Exam:  General: Well nourished, well developed, no acute distress + obese   Skin: + Vitiligo  HEENT: Eyes PERRLA, EOM intact, nose + clear discharge, throat +1/4 erythematous--poor dentition multiple voids ears TM clear  NECK: Supple, no bruits, No JVD, no nodes  Lungs: Clear, no rales, rhonchi, wheezing + coarse cough  Heart: Regular rate and rhythm, no murmurs, gallops, or rubs  Abdomen: flat, bowel sounds positive, no tenderness, or organomegaly  MS: Unable to abduct the arm greater than 70°, unable to bring arms overhead unable to do circular motions with arm due to a frozen shoulder, problems with both knees able to squat only FDC down pain in the knees and ankles with walking any significant distance  Neuro: Alert, CN intact, oriented X 3  Extremities: No cyanosis, clubbing, + 2/4 pitting edema         Assessment:       1. Peripheral edema    2. Obesity, unspecified classification, unspecified obesity type, unspecified whether serious comorbidity present    3. Sarcoidosis    4. Asthma, unspecified asthma severity, unspecified whether complicated, unspecified whether persistent    5. Osteoarthritis, unspecified osteoarthritis type, unspecified site    6. Weakness of right arm    7. Insomnia, unspecified type    8. Vitiligo    9. Dental caries    10. Anxiety    11. Depression, unspecified depression type        Plan:         Needs routine labs as outlined in the computer also needs chest x-ray EKG  Given note needs some assistance with her living due to obesity/osteoarthritis--history of surgical repair of a fractured right humerus with frozen shoulder rhinitis in the knees bilaterally arthritis ankles bilaterally/asthma/sarcoid--form  filled out this visit  Patient wants a walker with a seat  Patient will be on Lasix 40 mg 1 by mouth every morning DC hydrochlorothiazide fill amlodipine 5 mg 1 by mouth daily patient to come in when back on amlodipine see his blood pressure stable if not we'll add losartan  Needs to be on low-sodium diet/exercise as tolerated/decreased weight  Sarcoid appears to be stable  Needs refill of albuterol inhaler and aerosol nebulizer-solution  Needs  to see ophthalmologist to have right cataract removal

## 2018-07-09 NOTE — TELEPHONE ENCOUNTER
----- Message from Maddy Lee sent at 7/9/2018  3:13 PM CDT -----  Contact: Leola pete/[facility] [phone]  They have not received the prescription for the amlodipine.  Will you please send it.  Thank you!

## 2018-07-11 RX ORDER — AMLODIPINE BESYLATE 5 MG/1
10 TABLET ORAL DAILY
Qty: 30 TABLET | Refills: 5 | OUTPATIENT
Start: 2018-07-11 | End: 2018-08-10 | Stop reason: SDUPTHER

## 2018-08-10 ENCOUNTER — OFFICE VISIT (OUTPATIENT)
Dept: PRIMARY CARE CLINIC | Facility: CLINIC | Age: 63
End: 2018-08-10
Payer: MEDICARE

## 2018-08-10 VITALS
BODY MASS INDEX: 54.75 KG/M2 | OXYGEN SATURATION: 100 % | RESPIRATION RATE: 18 BRPM | DIASTOLIC BLOOD PRESSURE: 79 MMHG | HEIGHT: 61 IN | HEART RATE: 53 BPM | WEIGHT: 290 LBS | SYSTOLIC BLOOD PRESSURE: 144 MMHG

## 2018-08-10 DIAGNOSIS — H11.009 PTERYGIUM, UNSPECIFIED LATERALITY: ICD-10-CM

## 2018-08-10 DIAGNOSIS — F32.A DEPRESSION, UNSPECIFIED DEPRESSION TYPE: ICD-10-CM

## 2018-08-10 DIAGNOSIS — J45.909 ASTHMA, UNSPECIFIED ASTHMA SEVERITY, UNSPECIFIED WHETHER COMPLICATED, UNSPECIFIED WHETHER PERSISTENT: ICD-10-CM

## 2018-08-10 DIAGNOSIS — M25.572 BILATERAL ANKLE PAIN, UNSPECIFIED CHRONICITY: ICD-10-CM

## 2018-08-10 DIAGNOSIS — M25.571 BILATERAL ANKLE PAIN, UNSPECIFIED CHRONICITY: ICD-10-CM

## 2018-08-10 DIAGNOSIS — I10 ESSENTIAL HYPERTENSION: ICD-10-CM

## 2018-08-10 DIAGNOSIS — K02.9 DENTAL CARIES: ICD-10-CM

## 2018-08-10 DIAGNOSIS — F41.9 ANXIETY: ICD-10-CM

## 2018-08-10 DIAGNOSIS — L80 VITILIGO: ICD-10-CM

## 2018-08-10 DIAGNOSIS — H25.9 SENILE CATARACT, UNSPECIFIED AGE-RELATED CATARACT TYPE, UNSPECIFIED LATERALITY: ICD-10-CM

## 2018-08-10 DIAGNOSIS — M25.562 PAIN IN BOTH KNEES, UNSPECIFIED CHRONICITY: ICD-10-CM

## 2018-08-10 DIAGNOSIS — M19.90 OSTEOARTHRITIS, UNSPECIFIED OSTEOARTHRITIS TYPE, UNSPECIFIED SITE: Primary | ICD-10-CM

## 2018-08-10 DIAGNOSIS — E66.9 OBESITY, UNSPECIFIED CLASSIFICATION, UNSPECIFIED OBESITY TYPE, UNSPECIFIED WHETHER SERIOUS COMORBIDITY PRESENT: ICD-10-CM

## 2018-08-10 DIAGNOSIS — D86.9 SARCOIDOSIS: ICD-10-CM

## 2018-08-10 DIAGNOSIS — M25.561 PAIN IN BOTH KNEES, UNSPECIFIED CHRONICITY: ICD-10-CM

## 2018-08-10 DIAGNOSIS — M75.01 ADHESIVE CAPSULITIS OF RIGHT SHOULDER: ICD-10-CM

## 2018-08-10 PROCEDURE — 99213 OFFICE O/P EST LOW 20 MIN: CPT | Mod: S$PBB,,, | Performed by: FAMILY MEDICINE

## 2018-08-10 PROCEDURE — 99999 PR PBB SHADOW E&M-EST. PATIENT-LVL V: CPT | Mod: PBBFAC,,, | Performed by: FAMILY MEDICINE

## 2018-08-10 PROCEDURE — 99215 OFFICE O/P EST HI 40 MIN: CPT | Mod: PBBFAC,PN,25 | Performed by: FAMILY MEDICINE

## 2018-08-10 PROCEDURE — 96372 THER/PROPH/DIAG INJ SC/IM: CPT | Mod: PBBFAC,PN

## 2018-08-10 RX ORDER — TRAMADOL HYDROCHLORIDE 50 MG/1
50 TABLET ORAL EVERY 6 HOURS PRN
Qty: 30 TABLET | Refills: 0 | Status: SHIPPED | OUTPATIENT
Start: 2018-08-10 | End: 2018-08-20

## 2018-08-10 RX ORDER — AMLODIPINE BESYLATE 5 MG/1
10 TABLET ORAL DAILY
Qty: 30 TABLET | Refills: 5 | Status: SHIPPED | OUTPATIENT
Start: 2018-08-10 | End: 2019-09-03

## 2018-08-10 RX ORDER — IBUPROFEN 600 MG/1
600 TABLET ORAL 3 TIMES DAILY
Qty: 60 TABLET | Refills: 5 | Status: SHIPPED | OUTPATIENT
Start: 2018-08-10 | End: 2019-09-03

## 2018-08-10 RX ORDER — METHYLPREDNISOLONE 4 MG/1
TABLET ORAL
Qty: 1 PACKAGE | Refills: 0 | Status: SHIPPED | OUTPATIENT
Start: 2018-08-10 | End: 2018-08-31

## 2018-08-10 RX ORDER — FUROSEMIDE 40 MG/1
40 TABLET ORAL DAILY
Qty: 30 TABLET | Refills: 5 | Status: SHIPPED | OUTPATIENT
Start: 2018-08-10 | End: 2019-09-03

## 2018-08-10 RX ORDER — BETAMETHASONE SODIUM PHOSPHATE AND BETAMETHASONE ACETATE 3; 3 MG/ML; MG/ML
12 INJECTION, SUSPENSION INTRA-ARTICULAR; INTRALESIONAL; INTRAMUSCULAR; SOFT TISSUE
Status: COMPLETED | OUTPATIENT
Start: 2018-08-10 | End: 2018-08-10

## 2018-08-10 RX ORDER — ALBUTEROL SULFATE 0.83 MG/ML
2.5 SOLUTION RESPIRATORY (INHALATION) EVERY 6 HOURS PRN
Qty: 100 EACH | Refills: 5 | Status: SHIPPED | OUTPATIENT
Start: 2018-08-10 | End: 2022-01-10

## 2018-08-10 RX ADMIN — BETAMETHASONE SODIUM PHOSPHATE AND BETAMETHASONE ACETATE 12 MG: 3; 3 INJECTION, SUSPENSION INTRA-ARTICULAR; INTRALESIONAL; INTRAMUSCULAR at 01:08

## 2018-08-10 NOTE — PROGRESS NOTES
Subjective:       Patient ID: Dea Newton is a 62 y.o. female.    Chief Complaint: Medication Problem (BP meds causing dizziness); Foot Swelling; and Shortness of Breath (fatigue )    HPI:  62-year-old female in for problems with blood pressure, foot swelling and pain in the plantar aspect especially near the heel, shortness of breath, fatigue.      Complaining unable to tolerate the pain----since fractured right arm shoulder is frozen--over a year now--not seeing an orthopedist, pain in the knee, and sore the bottom of the left foot--  Has gets up stiff all the time.       Blood pressure appears to be stable now at 144/79 did not take blood pressure medicines yet this morning still blood pressure is markedly improved from last visit       Ft swelling--ankle swells and has pain bilaterally left greater than right    Office visit 07/09/2018-- 62-year-old female playing of headaches dizziness swelling and body aches.  Patient went to the emergency room 2-3 weeks ago--patient was given pressure medications patient supposed to be on 2 blood pressure pills amlodipine and hydrochlorothiazide--blood pressure 173/75 repeated 162/71--but patient out of amlodipine--ran out a few days ago.       Headaches in the frontal area--daughter feels secondary to tension patient is moving.  Patient had to move out of her house and does not have another how she does needs papers filled out--lower living in.  Patient states needs a limited because not able to do anything for self--- patient fractured her right humerus and has a frozen shoulder so unable to clean sweep mop.  Also feeling dizzy feels probably secondary to being out of blood pressure medications    ROS:  Skin: no psoriasis, eczema, skin cancer + vitiligo  HEENT: + headache--not sure pressure her tension or stress--no  ocular pain, blurred vision, diplopia, epistaxis, hoarseness change in voice, thyroid trouble + dental caries --states no medication to repair teeth , had  left cataract removed still has right cataract to be removed--did not see dentist and was not able scheduled surgery for cataract  Lung: No pneumonia, frontal area?   Tb, wheezing, SOB, + asthma, + sarcoidosis--does get some shortness of breath  Heart: No chest pain,+ ankle edema, +palpitations no MI, rosendo murmur, hypertension, hyperlipidemia  Abdomen: No nausea, vomiting, diarrhea, constipation, ulcers, hepatitis, gallbladder disease, melena, hematochezia, hematemesis  : no UTI, renal disease, stones  GYN negative  MS: no fractures, +O/A- knees bilaterally, ankles bilaterally, history of a fractured right humerus with frozen shoulders hard to abduct the arm had surgery unable to abduct arm  more than 70° unable raise arms overhead lumbar spine,no  lupus, rheumatoid, gout---biggest pain appears to be now on the left ankle and plantar surface of the foot--the pain in the right shoulder right ankle persistent stiffness when resting and then beginning to walk or move--due to knee ankle and shoulder pain  Neuro: + dizziness, LOC, seizures   No diabetes, no anemia, + anxiety, + depression  ,, 8 children, lives alone raising her 4 grandkids--patient has   4 grandchildren so able to stay with but needs permission if the person is not on her list  to have another adult stay with her     Objective:   Physical Exam:  General: Well nourished, well developed, no acute distress + obese   Skin: + Vitiligo  HEENT: Eyes PERRLA, EOM intact, nose - clear , throat nonerythematous--poor dentition multiple voids ears TM clear  NECK: Supple, no bruits, No JVD, no nodes  Lungs: Clear, no rales, rhonchi, wheezing   Heart: Regular rate and rhythm, no murmurs, gallops, or rubs  Abdomen: flat, bowel sounds positive, no tenderness, or organomegaly  MS: Unable to abduct the arm greater than 70°, unable to bring arms overhead unable to do circular motions with arm due to a frozen shoulder, problems with both knees able to squat only  MCC down pain in the knees and ankles with walking any significant distance---both ankles swollen left greater than right pain in the left ankle with palpation flexion extension inversion eversion but bilateral peripheral edema  Neuro: Alert, CN intact, oriented X 3  Extremities: No cyanosis, clubbing, + 1/4 pitting edema but did not take blood pressure are fluid pills yet        Assessment:       No diagnosis found.    Plan:         Needs routine labs as outlined in the computer also needs chest x-ray EKG  Given note needs some assistance with her living due to obesity/osteoarthritis--history of surgical repair of a fractured right humerus with frozen shoulder rhinitis in the knees bilaterally arthritis ankles bilaterally/asthma/sarcoid--form filled out this visit  Patient wants a walker with a seat  Patient will be on Lasix 40 mg 1 by mouth every morning DC hydrochlorothiazide fill amlodipine 5 mg 1 by mouth daily patient to come in when back on amlodipine see his blood pressure stable if not we'll add losartan  Needs to be on low-sodium diet/exercise as tolerated/decreased weight  Sarcoid appears to be stable  Needs refill of albuterol inhaler and aerosol nebulizer-solution  Needs  to see ophthalmologist to have right cataract removal

## 2018-08-10 NOTE — PROGRESS NOTES
Patient verified by name and . 12 mg betamethasone given im to left vent gluteal, using aseptic technique. Patient tolerated well/

## 2018-08-24 ENCOUNTER — TELEPHONE (OUTPATIENT)
Dept: OPHTHALMOLOGY | Facility: CLINIC | Age: 63
End: 2018-08-24

## 2018-08-28 ENCOUNTER — TELEPHONE (OUTPATIENT)
Dept: ORTHOPEDICS | Facility: CLINIC | Age: 63
End: 2018-08-28

## 2018-08-28 NOTE — TELEPHONE ENCOUNTER
Left call back message. Calling patient to determine if patient needs to be seen by ortho and/or pain management r/t referral in system.

## 2018-08-28 NOTE — TELEPHONE ENCOUNTER
----- Message from Omkar Lam sent at 8/27/2018  9:57 AM CDT -----  Contact: self   Pt is requesting a call back regarding an appt for her foot. Pt can be reached at [phone].

## 2018-09-12 ENCOUNTER — OFFICE VISIT (OUTPATIENT)
Dept: PRIMARY CARE CLINIC | Facility: CLINIC | Age: 63
End: 2018-09-12
Payer: MEDICARE

## 2018-09-12 VITALS
HEART RATE: 51 BPM | OXYGEN SATURATION: 100 % | DIASTOLIC BLOOD PRESSURE: 76 MMHG | RESPIRATION RATE: 18 BRPM | BODY MASS INDEX: 54.56 KG/M2 | WEIGHT: 289 LBS | HEIGHT: 61 IN | SYSTOLIC BLOOD PRESSURE: 172 MMHG

## 2018-09-12 DIAGNOSIS — I10 HYPERTENSION, UNSPECIFIED TYPE: Primary | ICD-10-CM

## 2018-09-12 DIAGNOSIS — M19.90 OSTEOARTHRITIS, UNSPECIFIED OSTEOARTHRITIS TYPE, UNSPECIFIED SITE: ICD-10-CM

## 2018-09-12 DIAGNOSIS — R51.9 NONINTRACTABLE HEADACHE, UNSPECIFIED CHRONICITY PATTERN, UNSPECIFIED HEADACHE TYPE: ICD-10-CM

## 2018-09-12 DIAGNOSIS — L80 VITILIGO: ICD-10-CM

## 2018-09-12 DIAGNOSIS — H25.9 SENILE CATARACT, UNSPECIFIED AGE-RELATED CATARACT TYPE, UNSPECIFIED LATERALITY: ICD-10-CM

## 2018-09-12 DIAGNOSIS — F32.A DEPRESSION, UNSPECIFIED DEPRESSION TYPE: ICD-10-CM

## 2018-09-12 DIAGNOSIS — D86.9 SARCOIDOSIS: ICD-10-CM

## 2018-09-12 DIAGNOSIS — K02.9 DENTAL CARIES: ICD-10-CM

## 2018-09-12 DIAGNOSIS — E66.9 OBESITY, UNSPECIFIED CLASSIFICATION, UNSPECIFIED OBESITY TYPE, UNSPECIFIED WHETHER SERIOUS COMORBIDITY PRESENT: ICD-10-CM

## 2018-09-12 DIAGNOSIS — J45.909 ASTHMA, UNSPECIFIED ASTHMA SEVERITY, UNSPECIFIED WHETHER COMPLICATED, UNSPECIFIED WHETHER PERSISTENT: ICD-10-CM

## 2018-09-12 DIAGNOSIS — F41.9 ANXIETY: ICD-10-CM

## 2018-09-12 PROCEDURE — 99999 PR PBB SHADOW E&M-EST. PATIENT-LVL III: CPT | Mod: PBBFAC,,, | Performed by: FAMILY MEDICINE

## 2018-09-12 PROCEDURE — 99213 OFFICE O/P EST LOW 20 MIN: CPT | Mod: S$PBB,,, | Performed by: FAMILY MEDICINE

## 2018-09-12 PROCEDURE — 99213 OFFICE O/P EST LOW 20 MIN: CPT | Mod: PBBFAC,PN | Performed by: FAMILY MEDICINE

## 2018-09-12 RX ORDER — LOSARTAN POTASSIUM 50 MG/1
50 TABLET ORAL DAILY
Qty: 90 TABLET | Refills: 3 | Status: SHIPPED | OUTPATIENT
Start: 2018-09-12 | End: 2019-09-03 | Stop reason: ALTCHOICE

## 2018-09-12 NOTE — PROGRESS NOTES
Subjective:       Patient ID: Dea Newton is a 63 y.o. female.    Chief Complaint: Headache (sinus )    HPI:  63-year-old female in for blood pressure medications making her sick--patient is on amlodipine---patient states this feels bad when takesit.  Stops at 3-4 days ago blood pressure 172/76.       Patient also complaining of headaches for the last 2-3 days--+sinus congestion--no fever, pressure sensation in the bridge of the nose no runny nose, no sore throat, no cough.      63-year-old female Office visit 08/10/2018   62-year-old female in for problems with blood pressure, foot swelling and pain in the plantar aspect especially near the heel, shortness of breath, fatigue.      Complaining unable to tolerate the pain----since fractured right arm shoulder is frozen--over a year now--not seeing an orthopedist, pain in the knee, and sore the bottom of the left foot--  Has gets up stiff all the time.       Blood pressure appears to be stable now at 144/79 did not take blood pressure medicines yet this morning still blood pressure is markedly improved from last visit       Ft swelling--ankle swells and has pain bilaterally left greater than right    ROS:  Skin: no psoriasis, eczema, skin cancer + vitiligo  HEENT: + headache-,--no  ocular pain, blurred vision, diplopia, epistaxis, hoarseness change in voice, thyroid trouble + dental caries --has appoint with the dentist.  Had left cataract removed scheduled to have an appointment for possible right cataract  Lung: No pneumonia, frontal area?   Tb, wheezing, SOB, + asthma, + sarcoidosis--does get some shortness of breath--occasional wheezing at night--has albuterol does help  Heart: No chest pain,+ ankle edema, no palpitations no MI, rosendo murmur+ hypertension,no  hyperlipidemia  Abdomen: No nausea, vomiting, diarrhea, constipation, ulcers, hepatitis, gallbladder disease, melena, hematochezia, hematemesis  : no UTI, renal disease, stones  GYN negative  MS: no  fractures, +O/A- knees bilaterally, ankles bilaterally, history of a fractured right humerus with frozen shoulders hard to abduct the arm had surgery unable to abduct arm  more than 70° unable raise arms overhead lumbar spine,no  lupus, rheumatoid, gout---biggest pain appears to be now on the left ankle and plantar surface of the foot--the pain in the right shoulder right ankle persistent stiffness when resting and then beginning to walk or move--due to knee ankle and shoulder pain--no significant change--has appoint with orthopedist and of October    Neuro: + dizziness-last 2 day, LOC, seizures   No diabetes, no anemia, + anxiety, + depression  ,, 8 children, lives alone raising her 4 grandkids--patient has   4 grandchildren so able to stay with but needs permission if the person is not on her list  to have another adult stay with her     Objective:   Physical Exam:  General: Well nourished, well developed, no acute distress + obese   Skin: + Vitiligo  HEENT: Eyes PERRLA, EOM intact, nose - clear , throat nonerythematous--poor dentition multiple voids gingiva appear okay ears TM clear  NECK: Supple, no bruits, No JVD, no nodes  Lungs: Clear, no rales, rhonchi, wheezing   Heart: Regular rate and rhythm, no murmurs, gallops, or rubs--no wheezing at this time  Abdomen: flat, bowel sounds positive, no tenderness, or organomegaly  MS: Unable to abduct the arm greater than 70°, unable to bring arms overhead unable to do circular motions with arm due to a frozen shoulder, problems with both knees able to squat only correction down pain in the knees and ankles with walking any significant distance---both ankles swollen left greater than right pain in the left ankle with palpation flexion extension inversion eversion but bilateral peripheral edema--no change  Neuro: Alert, CN intact, oriented X 3  Extremities: No cyanosis, clubbing, + 1/4 pitting edema but did not take blood pressure are fluid pills yet         Assessment:       1. Hypertension, unspecified type    2. Nonintractable headache, unspecified chronicity pattern, unspecified headache type    3. Depression, unspecified depression type    4. Anxiety    5. Senile cataract, unspecified age-related cataract type, unspecified laterality    6. Dental caries    7. Vitiligo    8. Asthma, unspecified asthma severity, unspecified whether complicated, unspecified whether persistent    9. Sarcoidosis    10. Obesity, unspecified classification, unspecified obesity type, unspecified whether serious comorbidity present    11. Osteoarthritis, unspecified osteoarthritis type, unspecified site        Plan:         Needs routine labs as outlined in the computer also needs chest x-ray EKG  Patient has appointment with orthopedist end of October for multiple joint issues  Patient will be on Lasix 40 mg 1 by mouth every morning   DC amlodipine making patient feel bad start Cozaar 50 q.d. patient to get arm blood pressure cuff check blood pressure daily if not improved will increase to 100 mg no HCT because on Lasix  Needs to be on low-sodium diet/exercise as tolerated/decreased weight  Sarcoid appears to be stable--is having occasional wheezing episodes using albuterol with significant relief  Needs  to see ophthalmologist to have right cataract removal  Return 1 week with blood pressure done at home with a arm cuff and get 1 done here

## 2018-09-18 ENCOUNTER — TELEPHONE (OUTPATIENT)
Dept: ORTHOPEDICS | Facility: CLINIC | Age: 63
End: 2018-09-18

## 2018-09-18 DIAGNOSIS — M25.511 RIGHT SHOULDER PAIN, UNSPECIFIED CHRONICITY: Primary | ICD-10-CM

## 2018-12-04 ENCOUNTER — TELEPHONE (OUTPATIENT)
Dept: ORTHOPEDICS | Facility: CLINIC | Age: 63
End: 2018-12-04

## 2018-12-04 DIAGNOSIS — M79.672 BILATERAL FOOT PAIN: Primary | ICD-10-CM

## 2018-12-04 DIAGNOSIS — M79.671 BILATERAL FOOT PAIN: Primary | ICD-10-CM

## 2018-12-04 NOTE — TELEPHONE ENCOUNTER
Patient coming for bilateral foot pain. Patient advised we would need xray's prior to appointment. Patient verbalized understanding and no other issues discussed

## 2019-03-20 DIAGNOSIS — Z12.39 BREAST CANCER SCREENING: ICD-10-CM

## 2019-09-03 ENCOUNTER — OFFICE VISIT (OUTPATIENT)
Dept: FAMILY MEDICINE | Facility: CLINIC | Age: 64
End: 2019-09-03
Payer: COMMERCIAL

## 2019-09-03 VITALS
TEMPERATURE: 98 F | DIASTOLIC BLOOD PRESSURE: 60 MMHG | HEART RATE: 55 BPM | WEIGHT: 281.5 LBS | SYSTOLIC BLOOD PRESSURE: 138 MMHG | BODY MASS INDEX: 53.15 KG/M2 | HEIGHT: 61 IN

## 2019-09-03 DIAGNOSIS — F33.2 SEVERE EPISODE OF RECURRENT MAJOR DEPRESSIVE DISORDER, WITHOUT PSYCHOTIC FEATURES: ICD-10-CM

## 2019-09-03 DIAGNOSIS — E66.01 MORBID OBESITY WITH BODY MASS INDEX (BMI) OF 50.0 TO 59.9 IN ADULT: ICD-10-CM

## 2019-09-03 DIAGNOSIS — G47.00 INSOMNIA, UNSPECIFIED TYPE: ICD-10-CM

## 2019-09-03 DIAGNOSIS — Z13.6 ENCOUNTER FOR SCREENING FOR CARDIOVASCULAR DISORDERS: ICD-10-CM

## 2019-09-03 DIAGNOSIS — I10 ESSENTIAL HYPERTENSION: ICD-10-CM

## 2019-09-03 DIAGNOSIS — F33.1 MODERATE RECURRENT MAJOR DEPRESSION: ICD-10-CM

## 2019-09-03 DIAGNOSIS — F33.9 MAJOR DEPRESSION, RECURRENT, CHRONIC: Primary | ICD-10-CM

## 2019-09-03 DIAGNOSIS — M15.9 OSTEOARTHRITIS OF MULTIPLE JOINTS, UNSPECIFIED OSTEOARTHRITIS TYPE: ICD-10-CM

## 2019-09-03 DIAGNOSIS — M79.7 FIBROMYALGIA: ICD-10-CM

## 2019-09-03 DIAGNOSIS — R30.0 DYSURIA: ICD-10-CM

## 2019-09-03 PROCEDURE — 99215 PR OFFICE/OUTPT VISIT, EST, LEVL V, 40-54 MIN: ICD-10-PCS | Mod: 25,S$GLB,, | Performed by: INTERNAL MEDICINE

## 2019-09-03 PROCEDURE — 96372 PR INJECTION,THERAP/PROPH/DIAG2ST, IM OR SUBCUT: ICD-10-PCS | Mod: S$GLB,,, | Performed by: INTERNAL MEDICINE

## 2019-09-03 PROCEDURE — 99215 OFFICE O/P EST HI 40 MIN: CPT | Mod: 25,S$GLB,, | Performed by: INTERNAL MEDICINE

## 2019-09-03 PROCEDURE — 99999 PR PBB SHADOW E&M-EST. PATIENT-LVL IV: ICD-10-PCS | Mod: PBBFAC,,, | Performed by: INTERNAL MEDICINE

## 2019-09-03 PROCEDURE — 99999 PR PBB SHADOW E&M-EST. PATIENT-LVL IV: CPT | Mod: PBBFAC,,, | Performed by: INTERNAL MEDICINE

## 2019-09-03 PROCEDURE — 96372 THER/PROPH/DIAG INJ SC/IM: CPT | Mod: S$GLB,,, | Performed by: INTERNAL MEDICINE

## 2019-09-03 RX ORDER — GABAPENTIN 600 MG/1
300 TABLET ORAL NIGHTLY
Qty: 15 TABLET | Refills: 11 | Status: SHIPPED | OUTPATIENT
Start: 2019-09-03 | End: 2019-09-24 | Stop reason: DRUGHIGH

## 2019-09-03 RX ORDER — VENLAFAXINE HYDROCHLORIDE 37.5 MG/1
37.5 CAPSULE, EXTENDED RELEASE ORAL DAILY
Qty: 30 CAPSULE | Refills: 11 | Status: SHIPPED | OUTPATIENT
Start: 2019-09-03 | End: 2019-09-24

## 2019-09-03 RX ORDER — FUROSEMIDE 40 MG/1
40 TABLET ORAL DAILY PRN
Qty: 30 TABLET | Refills: 5
Start: 2019-09-03 | End: 2022-01-10

## 2019-09-03 RX ORDER — KETOROLAC TROMETHAMINE 30 MG/ML
30 INJECTION, SOLUTION INTRAMUSCULAR; INTRAVENOUS ONCE
Status: COMPLETED | OUTPATIENT
Start: 2019-09-03 | End: 2019-09-03

## 2019-09-03 RX ADMIN — KETOROLAC TROMETHAMINE 30 MG: 30 INJECTION, SOLUTION INTRAMUSCULAR; INTRAVENOUS at 12:09

## 2019-09-03 NOTE — PATIENT INSTRUCTIONS
TODAY  - new medications - gabapentin take 1/2 tablet at night and effexor take 1 tablet during the day  - ok to take 2 tablets (1000 mg or 1g) of tylenol up to every 8 hours as needed or take 1 tablet of tylenol every 4.   - sending referral for counseling

## 2019-09-03 NOTE — ASSESSMENT & PLAN NOTE
mulitple joints  Obtain imaging from [facility]   Schedule tylenol 1 g up to every 8 hours in addition to other pain medications.   Focus on weight loss as well  Given toradol 30 mg IM today, avoid any nsaid today

## 2019-09-03 NOTE — ASSESSMENT & PLAN NOTE
BMI > 50   Discussed with patient that this is putting extreme stress on joints that can cause pain  Pt first goal is to stop drinking daily sodas - f/u on goal at next appt

## 2019-09-03 NOTE — PROGRESS NOTES
Primary Care Provider Appointment    Subjective:      Patient ID: Dea Newton is a 64 y.o. female. with hx of major depressive disorder, anxiety, asthma, sarcoidoisis, vtiligo, osteoarthritis, insomnia here for f/u.     Chief Complaint: Establish Care; Depression; and Hypertension    Previous patient of mine at [facility], she is not new to [facility] system.     Here with sister  Of note, pt still dealing with her daugther's death. She now has custody of her grandchildren (4) aged 16,14,13,10.  She had one session with counselor did not help. Feeling depressed and angry, thin patience.     Having a lot of pain in her lower back and both knees. Has a lot of tender points all over - when pressing on her arms and neck. Behind her achilles is the worst - on both legs.   Lower back starting today. Reports worsening of pain for the past 3 weeks.   Usually pain scale is a 5, always some pain daily. Right now, pain is about a 7.   Taking gabapentin 100 mg every 4 hours - 1 tablet with 1 tablet of tylenol extra strength in between. Was even taking gabapentin every hour.    Has terrible time sleeping due to inability to fall asleep.   sleeps only about 2-3 hours at night.   Does not nap during the day.     She is concerned about her weight and knows she needs to lose weight. Admits to drinking sodas daily.     Pt denies history of stomach ulcers or inability to take nsaids.    did not bring medication list     Past Surgical History:   Procedure Laterality Date     SECTION      CHOLECYSTECTOMY      ELBOW SURGERY      SHOULDER SURGERY Right     arm repair       Past Medical History:   Diagnosis Date    Anxiety     Asthma     Depression     Hypertension     Sarcoidosis     Vitiligo        Social History     Socioeconomic History    Marital status:      Spouse name: Not on file    Number of children: 9    Years of education: Not on file    Highest education level: Some college, no degree   Occupational  History    Occupation: retired      Comment: , housekeeping, deli    Social Needs    Financial resource strain: Hard    Food insecurity:     Worry: Often true     Inability: Often true    Transportation needs:     Medical: No     Non-medical: No   Tobacco Use    Smoking status: Never Smoker    Smokeless tobacco: Never Used   Substance and Sexual Activity    Alcohol use: No    Drug use: No    Sexual activity: Not Currently   Lifestyle    Physical activity:     Days per week: 0 days     Minutes per session: Not on file    Stress: Very much   Relationships    Social connections:     Talks on phone: More than three times a week     Gets together: More than three times a week     Attends Sabianist service: More than 4 times per year     Active member of club or organization: No     Attends meetings of clubs or organizations: Never     Relationship status:    Other Topics Concern    Not on file   Social History Narrative    Lives in [place] [address] housing in [place]    Now guardian of 4 grandchildren     Mother of 9 children - 2 are .     Irma: nondenomination     Has a lot of family support        Review of Systems   Constitutional: Positive for fatigue. Negative for unexpected weight change.   HENT: Negative for congestion and sinus pressure.    Eyes: Negative for visual disturbance.   Respiratory: Negative for shortness of breath and wheezing.    Cardiovascular: Negative for chest pain and palpitations.   Gastrointestinal: Negative for abdominal pain, blood in stool, constipation and diarrhea.   Genitourinary: Negative for difficulty urinating and pelvic pain.   Musculoskeletal: Positive for arthralgias, back pain, joint swelling, myalgias and neck pain. Negative for gait problem.   Skin: Negative for rash.   Allergic/Immunologic: Negative for immunocompromised state.   Neurological: Negative for dizziness and weakness.   Hematological: Does not bruise/bleed easily.  "  Psychiatric/Behavioral: Negative for confusion and suicidal ideas. The patient is not nervous/anxious.        Objective:   /60 (BP Location: Right arm, Patient Position: Sitting, BP Method: Large (Manual))   Pulse (!) 55   Temp 98.1 °F (36.7 °C) (Oral)   Ht 5' 1" (1.549 m)   Wt 127.7 kg (281 lb 8.4 oz)   BMI 53.19 kg/m²     Physical Exam   Constitutional: She is oriented to person, place, and time. She appears well-developed and well-nourished.   obese   HENT:   Head: Normocephalic.   Eyes: Pupils are equal, round, and reactive to light. Conjunctivae are normal.   Neck: Normal range of motion. Neck supple.   Cardiovascular: Normal rate, regular rhythm, normal heart sounds and intact distal pulses.   No murmur heard.  Pulmonary/Chest: Effort normal and breath sounds normal. No respiratory distress. She has no wheezes.   Abdominal: Soft. Bowel sounds are normal. She exhibits no distension and no mass. There is no tenderness.   Musculoskeletal: Normal range of motion. She exhibits tenderness. She exhibits no deformity.   Tenderness present on both upper arms (deltoid area), bilateral thighs in lateral region, ankles, both knees, cervical region    Neurological: She is alert and oriented to person, place, and time. No cranial nerve deficit.   Skin: Skin is warm and dry. No rash noted.   Psychiatric: Her behavior is normal. Judgment and thought content normal.   Appears down    Nursing note and vitals reviewed.      Lab Results   Component Value Date    WBC 5.20 09/13/2018    HGB 12.9 09/13/2018    HCT 39.3 09/13/2018     09/13/2018    CHOL 196 09/13/2018    TRIG 73 09/13/2018    HDL 54 09/13/2018    ALT 10 (L) 09/13/2018    AST 16 09/13/2018     09/13/2018    K 3.5 09/13/2018     09/13/2018    CREATININE 0.8 09/13/2018    BUN 12 09/13/2018    CO2 29 09/13/2018       RESULTS: Reviewed labs and images today - no recent labs in this system since 2018 and no current imaging. "             Assessment:   64 y.o. female with multiple co-morbid illnesses here to continue work-up of chronic issues notably hx of major depressive disorder, anxiety, asthma, sarcoidoisis, vtiligo, osteoarthritis, insomnia here for f/u     Plan:     Problem List Items Addressed This Visit        Psychiatric    Moderate recurrent major depression     phq9 of 14  Used shared decision making tool from [facility], patient and I decided on trying effexor.   Will start at 37.5 mg daily dose, discussed potential side effects   Referral to psychology   F/u in 3 weeks   Advised can take several week for medication to take effect               Cardiac/Vascular    Essential hypertension     BP not optimal but at level to start medication right now  In a lot of pain   Not on meds   Monitor with pain control and stress reduction   See if bp improves with control of above   2g low sodium diet and regular exercise recommended                 Endocrine    Morbid obesity with body mass index (BMI) of 50.0 to 59.9 in adult     BMI > 50   Discussed with patient that this is putting extreme stress on joints that can cause pain  Pt first goal is to stop drinking daily sodas - f/u on goal at next appt             Orthopedic    Osteoarthritis     mulitple joints  Obtain imaging from [facility]   Schedule tylenol 1 g up to every 8 hours in addition to other pain medications.   Focus on weight loss as well  Given toradol 30 mg IM today, avoid any nsaid today          Fibromyalgia     Generalized diffuse pain + depression + insomnia is concerning for dx   [facility]   Start effexor and increase gabapentin   Focus on stress reduction and pain control but   Discussed having reasonable expectations, doubt pt will ever be pain free but want her to have better quality of life.               Other    Insomnia     Could be related to depression, also concerned for fibromyalgia  Increase gabapentin to 300 mg qhs and see if this  helps with pain control and sleep  Discuss sleep hygiene at next visit            Other Visit Diagnoses                 Dysuria        Relevant Orders    URINALYSIS    Urine culture                Patient readiness: acceptance and barriers:social stressors    During the course of the visit the patient was educated and counseled about the following:     Obesity:   General weight loss/lifestyle modification strategies discussed (elicit support from others; identify saboteurs; non-food rewards, etc).  Diet interventions: reduce daily soda intake .    Goals: Obesity: Reduce calorie intake and BMI    Did patient meet goals/outcomes: No    The following self management tools provided: none    Patient Instructions (the written plan) was given to the patient/family.     Time spent with patient: 55 minutes    Barriers to medications present (yes )    Adverse reactions to current medications (no)    Over the counter medications reviewed (Yes)            Health Maintenance       Date Due Completion Date    Hepatitis C Screening 1955 ---    TETANUS VACCINE 08/30/1972 ---    Pap Smear with HPV Cotest 08/30/1975 ---    Mammogram 08/30/1994 ---    DEXA SCAN 08/30/1994 ---    Shingles Vaccine (1 of 2) 08/30/2004 ---    Pneumococcal Vaccine (65+ Low/Medium Risk) (1 of 2 - PCV13) 08/30/2019 ---    Influenza Vaccine (1) 09/01/2019 10/29/2014    Lipid Panel 09/13/2023 9/13/2018    Colonoscopy 04/04/2027 4/4/2017        Obtain medical records from [facility] including labs, imaging, advanced care directives, health maintenance.     Follow up in about 3 weeks (around 9/24/2019) for pain control, depression, ARDIANA-7,  flu shot .  Total face-to-face time was 60 min, 50% of this was spent on counseling and coordination of care. The following issues were discussed: hx of major depressive disorder, anxiety, asthma, sarcoidoisis, vtiligo, osteoarthritis, insomnia here for f/u  And Updated and reviewed medical, surgical, family, social history  in chart       Shruti Araiza MD  Internal Medicine- Geriatrics  [facility] [place]

## 2019-09-03 NOTE — ASSESSMENT & PLAN NOTE
phq9 of 14  Used shared decision making tool from [facility], patient and I decided on trying effexor.   Will start at 37.5 mg daily dose  Referral to psychology   F/u in 3 weeks   Advised can take several week for medication to take effect

## 2019-09-03 NOTE — ASSESSMENT & PLAN NOTE
Generalized diffuse pain + depression + insomnia is concerning for dx   Obtain last labs from [facility]   Start effexor and increase gabapentin   Focus on stress reduction and pain control but   Discussed having reasonable expectations, doubt pt will ever be pain free but want her to have better quality of life.

## 2019-09-03 NOTE — ASSESSMENT & PLAN NOTE
Could be related to depression, also concerned for fibromyalgia  Increase gabapentin to 300 mg qhs and see if this helps with pain control and sleep  Discuss sleep hygiene at next visit

## 2019-09-03 NOTE — ASSESSMENT & PLAN NOTE
BP not optimal but at level to start medication right now  In a lot of pain   Not on meds   Monitor with pain control and stress reduction   See if bp improves with control of above   2g low sodium diet and regular exercise recommended

## 2019-09-05 PROBLEM — I70.0 AORTIC ATHEROSCLEROSIS: Status: ACTIVE | Noted: 2019-09-05

## 2019-09-05 PROBLEM — I27.21 SECONDARY PULMONARY ARTERIAL HYPERTENSION: Status: ACTIVE | Noted: 2019-09-05

## 2019-09-05 PROBLEM — J44.89 CHRONIC OBSTRUCTIVE ASTHMA: Status: ACTIVE | Noted: 2017-10-09

## 2019-09-05 PROBLEM — I50.30 HEART FAILURE WITH PRESERVED EJECTION FRACTION: Status: ACTIVE | Noted: 2019-09-05

## 2019-09-08 ENCOUNTER — PATIENT OUTREACH (OUTPATIENT)
Dept: ADMINISTRATIVE | Facility: HOSPITAL | Age: 64
End: 2019-09-08

## 2019-09-08 DIAGNOSIS — Z11.59 NEED FOR HEPATITIS C SCREENING TEST: Primary | ICD-10-CM

## 2019-09-24 ENCOUNTER — OFFICE VISIT (OUTPATIENT)
Dept: FAMILY MEDICINE | Facility: CLINIC | Age: 64
End: 2019-09-24
Payer: COMMERCIAL

## 2019-09-24 VITALS
SYSTOLIC BLOOD PRESSURE: 132 MMHG | WEIGHT: 284.38 LBS | HEART RATE: 60 BPM | BODY MASS INDEX: 53.69 KG/M2 | HEIGHT: 61 IN | DIASTOLIC BLOOD PRESSURE: 68 MMHG | OXYGEN SATURATION: 98 % | TEMPERATURE: 98 F

## 2019-09-24 DIAGNOSIS — F33.1 MODERATE RECURRENT MAJOR DEPRESSION: Primary | ICD-10-CM

## 2019-09-24 DIAGNOSIS — E66.01 MORBID OBESITY WITH BODY MASS INDEX (BMI) OF 50.0 TO 59.9 IN ADULT: ICD-10-CM

## 2019-09-24 DIAGNOSIS — M79.7 FIBROMYALGIA: ICD-10-CM

## 2019-09-24 DIAGNOSIS — I10 ESSENTIAL HYPERTENSION: ICD-10-CM

## 2019-09-24 DIAGNOSIS — J44.89 CHRONIC OBSTRUCTIVE ASTHMA: ICD-10-CM

## 2019-09-24 DIAGNOSIS — G47.00 INSOMNIA, UNSPECIFIED TYPE: ICD-10-CM

## 2019-09-24 DIAGNOSIS — I50.30 HEART FAILURE WITH PRESERVED EJECTION FRACTION: ICD-10-CM

## 2019-09-24 DIAGNOSIS — N95.1 POSTMENOPAUSAL SYNDROME: ICD-10-CM

## 2019-09-24 PROCEDURE — 90662 IIV NO PRSV INCREASED AG IM: CPT | Mod: S$GLB,,, | Performed by: INTERNAL MEDICINE

## 2019-09-24 PROCEDURE — 90662 FLU VACCINE - HIGH DOSE (65+) PRESERVATIVE FREE IM: ICD-10-PCS | Mod: S$GLB,,, | Performed by: INTERNAL MEDICINE

## 2019-09-24 PROCEDURE — G0008 ADMIN INFLUENZA VIRUS VAC: HCPCS | Mod: S$GLB,,, | Performed by: INTERNAL MEDICINE

## 2019-09-24 PROCEDURE — 99999 PR PBB SHADOW E&M-EST. PATIENT-LVL IV: CPT | Mod: PBBFAC,,, | Performed by: INTERNAL MEDICINE

## 2019-09-24 PROCEDURE — 99214 PR OFFICE/OUTPT VISIT, EST, LEVL IV, 30-39 MIN: ICD-10-PCS | Mod: 25,S$GLB,, | Performed by: INTERNAL MEDICINE

## 2019-09-24 PROCEDURE — G0008 FLU VACCINE - HIGH DOSE (65+) PRESERVATIVE FREE IM: ICD-10-PCS | Mod: S$GLB,,, | Performed by: INTERNAL MEDICINE

## 2019-09-24 PROCEDURE — 99999 PR PBB SHADOW E&M-EST. PATIENT-LVL IV: ICD-10-PCS | Mod: PBBFAC,,, | Performed by: INTERNAL MEDICINE

## 2019-09-24 PROCEDURE — 99214 OFFICE O/P EST MOD 30 MIN: CPT | Mod: 25,S$GLB,, | Performed by: INTERNAL MEDICINE

## 2019-09-24 RX ORDER — GABAPENTIN 300 MG/1
CAPSULE ORAL
Qty: 30 CAPSULE | Refills: 11 | Status: SHIPPED | OUTPATIENT
Start: 2019-09-24 | End: 2022-01-10

## 2019-09-24 RX ORDER — VENLAFAXINE HYDROCHLORIDE 75 MG/1
75 CAPSULE, EXTENDED RELEASE ORAL DAILY
Qty: 30 CAPSULE | Refills: 11 | Status: SHIPPED | OUTPATIENT
Start: 2019-09-24 | End: 2022-01-10

## 2019-09-24 NOTE — ASSESSMENT & PLAN NOTE
NYHA Stage 2, AHA Class 2, Grade 2 diastolic dysfunction seen on echo 4/2019.   Mild LE edema   Instructed pt to take lasix every other day  Spent time teaching pt how to read food labels to determine sodium intake   Aim for less than 1500 mg of salt a day

## 2019-09-24 NOTE — ASSESSMENT & PLAN NOTE
Increase effexor to 75 mg   Did not get gabapentin? Changed script to 300 mg.   Plan to start with 300 mg qhs for 4 days then go up to 600 mg if needed only.

## 2019-09-24 NOTE — PROGRESS NOTES
Primary Care Provider Appointment    Subjective:      Patient ID: Dea Newton is a 64 y.o. female hx of major depressive disorder, anxiety, asthma, pulmonary hypertension, HFpEF, sarcoidoisis, morbid obesity, vtiligo, osteoarthritis, fibromyalgia, insomnia here for f/u    Chief Complaint: Depression and Chronic Pain  Here with sister today     Feeling better than last visit  but lately feeling more tired.   Still having pain  Mood is better on effexor   Was not able to get gabapentin due to insurance issues?     Gained 3 lbs since last visit   Does not add salt to foods but does not monitor salt content of foods   She is asking how much calories should she eat a day     Psychology referral , pt did not get a call about this     Reports she has cut back from drinking sodas, drinking more water. Estimates she had 3-4 drinks last week as opposed to every day. Reviewed with patient nutrition information on [employer] Audit.     Received records from [employer], sent to medical records to fax in chart.   Of note, tested negative for hep c 2019     Past Surgical History:   Procedure Laterality Date     SECTION      CHOLECYSTECTOMY      ELBOW SURGERY      SHOULDER SURGERY Right     arm repair       Past Medical History:   Diagnosis Date    Anxiety     Asthma     Depression     Hypertension     Sarcoidosis     Vitiligo        Social History     Socioeconomic History    Marital status:      Spouse name: Not on file    Number of children: 9    Years of education: Not on file    Highest education level: Some college, no degree   Occupational History    Occupation: retired      Comment: , housekeeping, deli    Social Needs    Financial resource strain: Hard    Food insecurity:     Worry: Often true     Inability: Often true    Transportation needs:     Medical: No     Non-medical: No   Tobacco Use    Smoking status: Never Smoker    Smokeless tobacco: Never Used   Substance and Sexual Activity     "Alcohol use: No    Drug use: No    Sexual activity: Not Currently   Lifestyle    Physical activity:     Days per week: 0 days     Minutes per session: Not on file    Stress: Very much   Relationships    Social connections:     Talks on phone: More than three times a week     Gets together: More than three times a week     Attends Spiritism service: More than 4 times per year     Active member of club or organization: No     Attends meetings of clubs or organizations: Never     Relationship status:    Other Topics Concern    Not on file   Social History Narrative    Lives in [address] housing in [place]    Now guardian of 4 grandchildren     Mother of 9 children - 2 are .     Irma: nondenomination     Has a lot of family support        Review of Systems   Constitutional: Positive for fatigue and unexpected weight change.   Respiratory: Negative for shortness of breath and wheezing.    Cardiovascular: Negative for chest pain and palpitations.   Gastrointestinal: Negative for abdominal pain, constipation and diarrhea.   Musculoskeletal: Positive for arthralgias and myalgias.   Psychiatric/Behavioral: Negative for confusion and dysphoric mood.       Objective:   /68 (BP Location: Right arm, Patient Position: Sitting)   Pulse 60   Temp 98.4 °F (36.9 °C) (Oral)   Ht 5' 1" (1.549 m)   Wt 129 kg (284 lb 6.3 oz)   SpO2 98%   BMI 53.74 kg/m²     Physical Exam   Constitutional: She is oriented to person, place, and time. She appears well-developed and well-nourished.   Obese    Cardiovascular:   + lower extremity swelling 1+ in both legs    Pulmonary/Chest: Effort normal and breath sounds normal.   Neurological: She is alert and oriented to person, place, and time.   Psychiatric: She has a normal mood and affect.       Lab Results   Component Value Date    WBC 5.20 2018    HGB 12.9 2018    HCT 39.3 2018     2018    CHOL 196 2018    TRIG 73 2018    " HDL 54 09/13/2018    ALT 10 (L) 09/13/2018    AST 16 09/13/2018     09/13/2018    K 3.5 09/13/2018     09/13/2018    CREATININE 0.8 09/13/2018    BUN 12 09/13/2018    CO2 29 09/13/2018       RESULTS: Reviewed labs and images today    Assessment:   64 y.o. female with multiple co-morbid illnesses here to continue work-up of chronic issues notably major depressive disorder, anxiety, asthma, pulmonary hypertension, HFpEF, sarcoidoisis, morbid obesity, vtiligo, osteoarthritis, fibromyalgia, insomnia    Plan:     Problem List Items Addressed This Visit        Psychiatric    Moderate recurrent major depression - Primary     Doing better   Increase effexor to 75 mg   Referral to psychology again, will f/u with patient next week to make sure appt is made              Relevant Orders    Ambulatory referral to Psychology       Pulmonary    Chronic obstructive asthma     Stable   Lungs clear on exam               Cardiac/Vascular    Essential hypertension     Aim for less than 1500 mg of salt a day   Monitor   In preHTN range          Heart failure with preserved ejection fraction     NYHA Stage 2, AHA Class 2, Grade 2 diastolic dysfunction seen on echo 4/2019.   Mild LE edema   Instructed pt to take lasix every other day  Spent time teaching pt how to read food labels to determine sodium intake   Aim for less than 1500 mg of salt a day             Endocrine    Morbid obesity with body mass index (BMI) of 50.0 to 59.9 in adult     Gained 3 lb since last visit- likely due to fluid but spent significant amount of time teaching patient about how to read food labels and calculated her current caloric needs (2125)   For 0.5 lb weight loss per week, pt should follow 1875 calorie diet.   Found a sample diet plan of 1800 mg for pt and discussed with patient             Orthopedic    Fibromyalgia     Increase effexor to 75 mg   Did not get gabapentin? Changed script to 300 mg.   Plan to start with 300 mg qhs for 4 days then  go up to 600 mg if needed only.             Other    Insomnia     Did not get gabapentin - see fibromyalgia for details  Refilled med            Other Visit Diagnoses     Postmenopausal syndrome        Relevant Orders    DXA Bone Density Spine And Hip          Health Maintenance       Date Due Completion Date    Hepatitis C Screening 1955 ---    TETANUS VACCINE 08/30/1972 ---    Pap Smear with HPV Cotest 08/30/1975 ---    Mammogram 08/30/1994 ---    DEXA SCAN 08/30/1994 ---    Shingles Vaccine (1 of 2) 08/30/2004 ---    Pneumococcal Vaccine (65+ Low/Medium Risk) (1 of 2 - PCV13) 08/30/2019 ---    Influenza Vaccine (1) 09/01/2019 10/29/2014    Lipid Panel 09/13/2023 9/13/2018    Colonoscopy 04/04/2027 4/4/2017        Given flu shot today in clinic   Ordered dexa   Will give prevnar at next visit   Follow up in about 4 weeks (around 10/22/2019) for diet  changes, depression, chronic pain, sleep , HM .  Total face-to-face time was 60 min, 50% of this was spent on counseling and coordination of care. The following issues were discussed: major depressive disorder, anxiety, asthma, HFpEF,  morbid obesity, fibromyalgia, insomnia.     Shruti Araiza MD  Internal Medicine- Geriatrics  [facility] [place]

## 2019-09-24 NOTE — ASSESSMENT & PLAN NOTE
Gained 3 lb since last visit- likely due to fluid but spent significant amount of time teaching patient about how to read food labels and calculated her current caloric needs (2125)   For 0.5 lb weight loss per week, pt should follow 1875 calorie diet.   Found a sample diet plan of 1800 mg for pt and discussed with patient

## 2019-09-24 NOTE — ASSESSMENT & PLAN NOTE
Doing better   Increase effexor to 75 mg   Referral to psychology again, will f/u with patient next week to make sure appt is made

## 2019-09-24 NOTE — PATIENT INSTRUCTIONS
Today   - GOAL of 1875 calories a day to lose 0.5 lb a week   - AIM for less than 1500 mg of salt a day   - AIM for no more than 24 grams of sugar a day   - Naresh and Brit Heart Smart Sauce Low Sodium

## 2022-01-10 ENCOUNTER — OFFICE VISIT (OUTPATIENT)
Dept: PRIMARY CARE CLINIC | Facility: CLINIC | Age: 67
End: 2022-01-10
Payer: MEDICARE

## 2022-01-10 VITALS
WEIGHT: 287.94 LBS | BODY MASS INDEX: 54.4 KG/M2 | DIASTOLIC BLOOD PRESSURE: 70 MMHG | SYSTOLIC BLOOD PRESSURE: 134 MMHG | TEMPERATURE: 98 F | OXYGEN SATURATION: 99 % | HEART RATE: 61 BPM

## 2022-01-10 DIAGNOSIS — D86.9 SARCOIDOSIS: ICD-10-CM

## 2022-01-10 DIAGNOSIS — I70.0 AORTIC ATHEROSCLEROSIS: Primary | ICD-10-CM

## 2022-01-10 DIAGNOSIS — F33.1 MODERATE RECURRENT MAJOR DEPRESSION: ICD-10-CM

## 2022-01-10 DIAGNOSIS — J44.89 CHRONIC OBSTRUCTIVE ASTHMA: ICD-10-CM

## 2022-01-10 DIAGNOSIS — K21.9 GASTROESOPHAGEAL REFLUX DISEASE WITHOUT ESOPHAGITIS: ICD-10-CM

## 2022-01-10 DIAGNOSIS — E66.01 MORBID OBESITY WITH BODY MASS INDEX (BMI) OF 50.0 TO 59.9 IN ADULT: ICD-10-CM

## 2022-01-10 DIAGNOSIS — D25.9 UTERINE LEIOMYOMA, UNSPECIFIED LOCATION: ICD-10-CM

## 2022-01-10 DIAGNOSIS — I10 ESSENTIAL HYPERTENSION: ICD-10-CM

## 2022-01-10 DIAGNOSIS — I27.21 SECONDARY PULMONARY ARTERIAL HYPERTENSION: ICD-10-CM

## 2022-01-10 DIAGNOSIS — Z71.85 VACCINE COUNSELING: ICD-10-CM

## 2022-01-10 DIAGNOSIS — I50.32 CHRONIC HEART FAILURE WITH PRESERVED EJECTION FRACTION: ICD-10-CM

## 2022-01-10 PROCEDURE — 3075F SYST BP GE 130 - 139MM HG: CPT | Mod: HCNC,CPTII,S$GLB, | Performed by: INTERNAL MEDICINE

## 2022-01-10 PROCEDURE — 3288F FALL RISK ASSESSMENT DOCD: CPT | Mod: HCNC,CPTII,S$GLB, | Performed by: INTERNAL MEDICINE

## 2022-01-10 PROCEDURE — 3075F PR MOST RECENT SYSTOLIC BLOOD PRESS GE 130-139MM HG: ICD-10-PCS | Mod: HCNC,CPTII,S$GLB, | Performed by: INTERNAL MEDICINE

## 2022-01-10 PROCEDURE — 3008F PR BODY MASS INDEX (BMI) DOCUMENTED: ICD-10-PCS | Mod: HCNC,CPTII,S$GLB, | Performed by: INTERNAL MEDICINE

## 2022-01-10 PROCEDURE — 3078F PR MOST RECENT DIASTOLIC BLOOD PRESSURE < 80 MM HG: ICD-10-PCS | Mod: HCNC,CPTII,S$GLB, | Performed by: INTERNAL MEDICINE

## 2022-01-10 PROCEDURE — 99214 PR OFFICE/OUTPT VISIT, EST, LEVL IV, 30-39 MIN: ICD-10-PCS | Mod: HCNC,S$GLB,, | Performed by: INTERNAL MEDICINE

## 2022-01-10 PROCEDURE — 1159F MED LIST DOCD IN RCRD: CPT | Mod: HCNC,CPTII,S$GLB, | Performed by: INTERNAL MEDICINE

## 2022-01-10 PROCEDURE — 3008F BODY MASS INDEX DOCD: CPT | Mod: HCNC,CPTII,S$GLB, | Performed by: INTERNAL MEDICINE

## 2022-01-10 PROCEDURE — 3078F DIAST BP <80 MM HG: CPT | Mod: HCNC,CPTII,S$GLB, | Performed by: INTERNAL MEDICINE

## 2022-01-10 PROCEDURE — 1101F PR PT FALLS ASSESS DOC 0-1 FALLS W/OUT INJ PAST YR: ICD-10-PCS | Mod: HCNC,CPTII,S$GLB, | Performed by: INTERNAL MEDICINE

## 2022-01-10 PROCEDURE — 99214 OFFICE O/P EST MOD 30 MIN: CPT | Mod: HCNC,S$GLB,, | Performed by: INTERNAL MEDICINE

## 2022-01-10 PROCEDURE — 1159F PR MEDICATION LIST DOCUMENTED IN MEDICAL RECORD: ICD-10-PCS | Mod: HCNC,CPTII,S$GLB, | Performed by: INTERNAL MEDICINE

## 2022-01-10 PROCEDURE — 1101F PT FALLS ASSESS-DOCD LE1/YR: CPT | Mod: HCNC,CPTII,S$GLB, | Performed by: INTERNAL MEDICINE

## 2022-01-10 PROCEDURE — 1126F AMNT PAIN NOTED NONE PRSNT: CPT | Mod: HCNC,CPTII,S$GLB, | Performed by: INTERNAL MEDICINE

## 2022-01-10 PROCEDURE — 1126F PR PAIN SEVERITY QUANTIFIED, NO PAIN PRESENT: ICD-10-PCS | Mod: HCNC,CPTII,S$GLB, | Performed by: INTERNAL MEDICINE

## 2022-01-10 PROCEDURE — 3288F PR FALLS RISK ASSESSMENT DOCUMENTED: ICD-10-PCS | Mod: HCNC,CPTII,S$GLB, | Performed by: INTERNAL MEDICINE

## 2022-01-10 RX ORDER — PANTOPRAZOLE SODIUM 40 MG/1
40 TABLET, DELAYED RELEASE ORAL DAILY
Qty: 30 TABLET | Refills: 1 | Status: SHIPPED | OUTPATIENT
Start: 2022-01-10 | End: 2022-04-22

## 2022-01-10 NOTE — PATIENT INSTRUCTIONS
"- Please call [facility] vaccine clinic to schedule       - [facility] [place]   [address] [place] [address]  [address]  Phone number: [phone]      Patient Education       Acid Reflux and Gastroesophageal Reflux Disease in Adults   The Basics   Written by the doctors and editors at [facility]   What is acid reflux? -- Acid reflux is when the acid that is normally in your stomach backs up into the esophagus. The esophagus is the tube that carries food from your mouth to your stomach (figure 1).  When acid reflux causes bothersome symptoms or damage, doctors call it "gastroesophageal reflux disease" or "GERD."  What are the symptoms of acid reflux? -- The most common symptoms are:  · Heartburn, which is a burning feeling in the chest  · Regurgitation, which is when acid and undigested food flow back into your throat or mouth   Other symptoms might include:  · Stomach or chest pain  · Trouble swallowing  · Having a raspy voice or a sore throat  · Unexplained cough  · Nausea or vomiting  Is there anything I can do on my own to feel better? -- Yes. You might feel better if you:  · Lose weight (if you are overweight)  · Raise the head of your bed by 6 to 8 inches - You can do this by putting blocks of wood or rubber under 2 legs of the bed or a foam wedge under the mattress.  · Avoid foods that make your symptoms worse - For some people these include coffee, chocolate, alcohol, peppermint, and fatty foods.  · Stop smoking, if you smoke  · Avoid late meals - Lying down with a full stomach can make reflux worse. Try to plan meals for at least 2 to 3 hours before bedtime.  · Avoid tight clothing - Some people feel better if they wear comfortable clothing that does not squeeze the stomach area.   How is acid reflux treated? -- There are a few main types of medicines that can help with the symptoms of acid reflux. The most common are antacids, histamine blockers, and " "proton pump inhibitors (table 1). All of these medicines work by reducing or blocking stomach acid. But they each do that in a different way.  · For mild symptoms, antacids can help, but they work only for a short time. Histamine blockers are stronger and last longer than antacids. You can buy antacids and most histamine blockers without a prescription.  · For frequent and more severe symptoms, proton pump inhibitors are the most effective medicines. Some of these medicines are sold without a prescription. But there are other versions that your doctor can prescribe.  Sometimes, medicines cost less if you get them with a doctor's prescription. Other times, non-prescription medicines cost less. If you are worried about cost, ask your pharmacist about ways to pay less for your medicines.  Should I see a doctor or nurse about my acid reflux? -- Some people can manage their acid reflux on their own by changing their habits or taking non-prescription medicines. But you should see a doctor or nurse if:  · Your symptoms are severe or last a long time  · You cannot seem to control your symptoms  · You have had symptoms for many years  You should also see a doctor or nurse right away if you:  · Have trouble swallowing, or feel as though food gets "stuck" on the way down  · Lose weight when you are not trying to  · Have chest pain  · Choke when you eat  · Vomit blood or have bowel movements that are red, black, or look like tar  What if my child or teenager has acid reflux? -- If your child or teenager has acid reflux, take him or her to see a doctor or nurse. Do not give your child medicines to treat acid reflux without talking to a doctor or nurse.  In children, acid reflux can be caused by a number of problems. It's important to have a doctor or nurse check for these problems before trying any treatments.  All topics are updated as new evidence becomes available and our peer review process is complete.  This topic retrieved " from [employer] on: Sep 21, 2021.  Topic 58278 Version 11.0  Release: 29.4.2 - C29.263  © 2021 UpToDate, Inc. and/or its affiliates. All rights reserved.  figure 1: Upper digestive tract     The upper digestive tract includes the esophagus (the tube that connects the mouth to the stomach), the stomach, and the duodenum (the first part of the small intestine).  Graphic 28623 Version 6.0    table 1: Medicines used to reduce stomach acid  Medicine type  Medicine name examples    Antacids* Calcium carbonate (sample brand names: Maalox, Tums)    Aluminum hydroxide, magnesium hydroxide, and simethicone (sample brand name: Mylanta)   Surface agents Sucralfate (brand name: Carafate)   Histamine blockers¶  Famotidine (brand name: Pepcid)    Cimetidine (brand name: Tagamet)   Proton pump inhibitors Omeprazole (brand name: Prilosec)    Esomeprazole (brand name: Nexium)    Pantoprazole (brand name: Protonix)    Lansoprazole (brand name: Prevacid)    Dexlansoprazole (brand name: Dexilant)    Rabeprazole (brand name: AcipHex)   Graphic 32355 Version 14.0  Consumer Information Use and Disclaimer   This information is not specific medical advice and does not replace information you receive from your health care provider. This is only a brief summary of general information. It does NOT include all information about conditions, illnesses, injuries, tests, procedures, treatments, therapies, discharge instructions or life-style choices that may apply to you. You must talk with your health care provider for complete information about your health and treatment options. This information should not be used to decide whether or not to accept your health care provider's advice, instructions or recommendations. Only your health care provider has the knowledge and training to provide advice that is right for you. The use of this information is governed by the [employer] End User License Agreement, available at  https://www.[email or web address].com/en/solutions/lexicomp/about/julio c.The use of [employer] content is governed by the [employer] Terms of Use. ©2021 [employer]. All rights reserved.  Copyright   © 2021 UpToDate, Inc. and/or its affiliates. All rights reserved.

## 2022-01-10 NOTE — PROGRESS NOTES
Primary Care Provider Appointment    Subjective:      Patient ID: Dea Newton is a 66 y.o. female hx of major depressive disorder, anxiety, asthma, pulmonary hypertension, HFpEF, sarcoidoisis, morbid obesity, vtiligo, osteoarthritis, fibromyalgia, insomnia here to establish care.     Chief Complaint: Establish Care and Gastroesophageal Reflux    Pt was seen by me in 2019 and then she switched care to Dr. Rolon at [facility] in [place] but has not been had any care in a year. .      Here today to re-establish care.  She is 40 mins late to her appt.      Here in wheelchair     Has not taken covid 19 vaccine yet, has concerns and would like to discuss more  - pt reports that she has had covid in the past.     Has Fibroids with irregular and heavy  menstrual cycles, was seeing gynecology in [place] but would like to transfer care here. Does not reall her gynecologist name.     She reports that her main issue is gerd - taking tums and peptobismol without relief.   Denies blood in her stool or changes in her bowels     Also her arthritis has been flaring up.     Has had Bell Palsy affecting her left side.     Currently she is not taking any medications, has been off her prescribed medications for over a year.       Past Surgical History:   Procedure Laterality Date     SECTION      CHOLECYSTECTOMY      ELBOW SURGERY      SHOULDER SURGERY Right     arm repair       Past Medical History:   Diagnosis Date    Anxiety     Asthma     Depression     Hypertension     Sarcoidosis     Vitiligo        Social History     Socioeconomic History    Marital status:     Number of children: 9    Highest education level: Some college, no degree   Occupational History    Occupation: retired      Comment: , housekeeping, deli    Tobacco Use    Smoking status: Never Smoker    Smokeless tobacco: Never Used   Substance and Sexual Activity    Alcohol use: No    Drug use: No    Sexual activity: Not  Currently   Social History Narrative    Lives in [address] housing in [place]    Now guardian of 4 grandchildren     Mother of 9 children - 2 are .     Irma: nondenomination     Has a lot of family support        Review of Systems   Constitutional: Negative for activity change and appetite change.   HENT: Negative.    Respiratory: Negative.    Cardiovascular: Negative.    Gastrointestinal: Positive for abdominal pain.   Musculoskeletal: Positive for arthralgias.   Neurological: Positive for weakness.       Objective:   /70 (BP Location: Right arm, Patient Position: Sitting)   Pulse 61   Temp 98 °F (36.7 °C) (Temporal)   Wt 130.6 kg (287 lb 14.7 oz)   SpO2 99%   BMI 54.40 kg/m²     Physical Exam  Vitals reviewed.   Constitutional:       General: She is not in acute distress.     Appearance: She is obese. She is not ill-appearing or toxic-appearing.   HENT:      Head: Normocephalic.   Eyes:      Extraocular Movements: Extraocular movements intact.      Conjunctiva/sclera: Conjunctivae normal.   Cardiovascular:      Rate and Rhythm: Normal rate and regular rhythm.      Heart sounds: Normal heart sounds.   Pulmonary:      Effort: Pulmonary effort is normal. No respiratory distress.      Breath sounds: Normal breath sounds. No wheezing or rales.   Abdominal:      General: Bowel sounds are normal.      Palpations: Abdomen is soft.      Tenderness: There is no abdominal tenderness.   Neurological:      Mental Status: She is alert and oriented to person, place, and time.             Lab Results   Component Value Date    WBC 5.20 2018    HGB 12.9 2018    HCT 39.3 2018     2018    CHOL 196 2018    TRIG 73 2018    HDL 54 2018    ALT 10 (L) 2018    AST 16 2018     2018    K 3.5 2018     2018    CREATININE 0.8 2018    BUN 12 2018    CO2 29 2018         Current Outpatient Medications:      pantoprazole (PROTONIX) 40 MG tablet, Take 1 tablet (40 mg total) by mouth once daily., Disp: 30 tablet, Rfl: 1          Assessment:   66 y.o. female with multiple co-morbid illnesses here to establish care with new PCP and continue work-up of chronic issues notably gerd    Plan:     Problem List Items Addressed This Visit        Psychiatric    Moderate recurrent major depression     Discuss at next visit   Currently not taking any medications             Pulmonary    Chronic obstructive asthma     Stable   Currently not taking any inhalers            Secondary pulmonary arterial hypertension     Need repeat echo             Cardiac/Vascular    Essential hypertension     BP elevated  Discussed low sodium diet   Recheck BP at next visit   Obtain labs          Relevant Orders    CBC Auto Differential    Comprehensive Metabolic Panel    Heart failure with preserved ejection fraction     Denies any flares   Currently stable   Will need repeat echo at some point          Aortic atherosclerosis - Primary     Currently not taking medications   Discuss at next visit          Relevant Orders    Lipid Panel       ID    Vaccine counseling     Discussed benefit of covid 19 vaccination with patient and urged pt to get vaccinated             Immunology/Multi System    Sarcoidosis     Pt not following with pulmonary, pt disease has been inactive  Monitor and discuss referral to pulmonary at next visit             Endocrine    Morbid obesity with body mass index (BMI) of 50.0 to 59.9 in adult     Address at next visit             GI    Gastroesophageal reflux disease without esophagitis     Given information on gerd diet/handout   Trial of protonix 40 mg daily            Other Visit Diagnoses     Uterine leiomyoma, unspecified location        Relevant Orders    Ambulatory referral/consult to Gynecology          Health Maintenance       Date Due Completion Date    COVID-19 Vaccine (1) Never done ---    TETANUS VACCINE Never done ---     Mammogram Never done ---    Pap Smear with HPV Cotest Never done ---    DEXA SCAN Never done ---    Shingles Vaccine (1 of 2) Never done ---    Influenza Vaccine (1) 09/01/2021 9/24/2019    Lipid Panel 09/13/2023 9/13/2018    Colorectal Cancer Screening 04/04/2027 4/4/2017          Follow up in about 4 weeks (around 2/7/2022) for need phq9, depression, HTN . 20 minutes of total time spent on the encounter, which includes face to face time and non-face to face time preparing to see the patient (eg, review of tests), Obtaining and/or reviewing separately obtained history, Documenting clinical information in the electronic or other health record, Independently interpreting results (not separately reported) and communicating results to the patient/family/caregiver, or Care coordination (not separately reported).      Shruti Araiza MD  Internal Medicine- Geriatrics  [facility] [place]

## 2022-01-11 PROBLEM — Z71.85 VACCINE COUNSELING: Status: ACTIVE | Noted: 2022-01-11

## 2022-01-12 NOTE — ASSESSMENT & PLAN NOTE
Pt not following with pulmonary, pt disease has been inactive  Monitor and discuss referral to pulmonary at next visit

## 2022-01-18 ENCOUNTER — TELEPHONE (OUTPATIENT)
Dept: PRIMARY CARE CLINIC | Facility: CLINIC | Age: 67
End: 2022-01-18
Payer: MEDICARE

## 2022-01-18 NOTE — TELEPHONE ENCOUNTER
----- Message from Shruti Araiza MD sent at 1/10/2022 11:20 AM CST -----  Please call patient and see how she is doing with gerd

## 2022-04-22 ENCOUNTER — OFFICE VISIT (OUTPATIENT)
Dept: PRIMARY CARE CLINIC | Facility: CLINIC | Age: 67
End: 2022-04-22
Payer: MEDICARE

## 2022-04-22 VITALS
DIASTOLIC BLOOD PRESSURE: 68 MMHG | RESPIRATION RATE: 18 BRPM | WEIGHT: 289.88 LBS | OXYGEN SATURATION: 99 % | HEIGHT: 61 IN | BODY MASS INDEX: 54.73 KG/M2 | SYSTOLIC BLOOD PRESSURE: 120 MMHG | HEART RATE: 61 BPM | TEMPERATURE: 98 F

## 2022-04-22 DIAGNOSIS — Z12.31 SCREENING MAMMOGRAM, ENCOUNTER FOR: ICD-10-CM

## 2022-04-22 DIAGNOSIS — R35.0 URINARY FREQUENCY: ICD-10-CM

## 2022-04-22 DIAGNOSIS — M25.561 CHRONIC PAIN OF RIGHT KNEE: Primary | ICD-10-CM

## 2022-04-22 DIAGNOSIS — Z78.0 ASYMPTOMATIC MENOPAUSAL STATE: ICD-10-CM

## 2022-04-22 DIAGNOSIS — E66.01 MORBID OBESITY WITH BODY MASS INDEX (BMI) OF 50.0 TO 59.9 IN ADULT: ICD-10-CM

## 2022-04-22 DIAGNOSIS — Z76.89 ENCOUNTER TO ESTABLISH CARE: ICD-10-CM

## 2022-04-22 DIAGNOSIS — N89.8 VAGINAL ODOR: ICD-10-CM

## 2022-04-22 DIAGNOSIS — H11.003 PTERYGIUM EYE, BILATERAL: ICD-10-CM

## 2022-04-22 DIAGNOSIS — G89.29 CHRONIC PAIN OF RIGHT KNEE: Primary | ICD-10-CM

## 2022-04-22 DIAGNOSIS — R73.09 ELEVATED GLUCOSE: ICD-10-CM

## 2022-04-22 DIAGNOSIS — D86.9 SARCOIDOSIS: ICD-10-CM

## 2022-04-22 DIAGNOSIS — G51.0 LEFT-SIDED BELL'S PALSY: ICD-10-CM

## 2022-04-22 DIAGNOSIS — Z12.11 COLON CANCER SCREENING: ICD-10-CM

## 2022-04-22 PROCEDURE — 99214 PR OFFICE/OUTPT VISIT, EST, LEVL IV, 30-39 MIN: ICD-10-PCS | Mod: S$GLB,,, | Performed by: STUDENT IN AN ORGANIZED HEALTH CARE EDUCATION/TRAINING PROGRAM

## 2022-04-22 PROCEDURE — 1160F PR REVIEW ALL MEDS BY PRESCRIBER/CLIN PHARMACIST DOCUMENTED: ICD-10-PCS | Mod: CPTII,S$GLB,, | Performed by: STUDENT IN AN ORGANIZED HEALTH CARE EDUCATION/TRAINING PROGRAM

## 2022-04-22 PROCEDURE — 87481 CANDIDA DNA AMP PROBE: CPT | Mod: 59 | Performed by: STUDENT IN AN ORGANIZED HEALTH CARE EDUCATION/TRAINING PROGRAM

## 2022-04-22 PROCEDURE — 1101F PR PT FALLS ASSESS DOC 0-1 FALLS W/OUT INJ PAST YR: ICD-10-PCS | Mod: CPTII,S$GLB,, | Performed by: STUDENT IN AN ORGANIZED HEALTH CARE EDUCATION/TRAINING PROGRAM

## 2022-04-22 PROCEDURE — 1159F MED LIST DOCD IN RCRD: CPT | Mod: CPTII,S$GLB,, | Performed by: STUDENT IN AN ORGANIZED HEALTH CARE EDUCATION/TRAINING PROGRAM

## 2022-04-22 PROCEDURE — 3074F SYST BP LT 130 MM HG: CPT | Mod: CPTII,S$GLB,, | Performed by: STUDENT IN AN ORGANIZED HEALTH CARE EDUCATION/TRAINING PROGRAM

## 2022-04-22 PROCEDURE — 3288F FALL RISK ASSESSMENT DOCD: CPT | Mod: CPTII,S$GLB,, | Performed by: STUDENT IN AN ORGANIZED HEALTH CARE EDUCATION/TRAINING PROGRAM

## 2022-04-22 PROCEDURE — 1159F PR MEDICATION LIST DOCUMENTED IN MEDICAL RECORD: ICD-10-PCS | Mod: CPTII,S$GLB,, | Performed by: STUDENT IN AN ORGANIZED HEALTH CARE EDUCATION/TRAINING PROGRAM

## 2022-04-22 PROCEDURE — 3074F PR MOST RECENT SYSTOLIC BLOOD PRESSURE < 130 MM HG: ICD-10-PCS | Mod: CPTII,S$GLB,, | Performed by: STUDENT IN AN ORGANIZED HEALTH CARE EDUCATION/TRAINING PROGRAM

## 2022-04-22 PROCEDURE — 99999 PR PBB SHADOW E&M-EST. PATIENT-LVL V: CPT | Mod: PBBFAC,,, | Performed by: STUDENT IN AN ORGANIZED HEALTH CARE EDUCATION/TRAINING PROGRAM

## 2022-04-22 PROCEDURE — 3008F PR BODY MASS INDEX (BMI) DOCUMENTED: ICD-10-PCS | Mod: CPTII,S$GLB,, | Performed by: STUDENT IN AN ORGANIZED HEALTH CARE EDUCATION/TRAINING PROGRAM

## 2022-04-22 PROCEDURE — 1125F AMNT PAIN NOTED PAIN PRSNT: CPT | Mod: CPTII,S$GLB,, | Performed by: STUDENT IN AN ORGANIZED HEALTH CARE EDUCATION/TRAINING PROGRAM

## 2022-04-22 PROCEDURE — 1101F PT FALLS ASSESS-DOCD LE1/YR: CPT | Mod: CPTII,S$GLB,, | Performed by: STUDENT IN AN ORGANIZED HEALTH CARE EDUCATION/TRAINING PROGRAM

## 2022-04-22 PROCEDURE — 99999 PR PBB SHADOW E&M-EST. PATIENT-LVL V: ICD-10-PCS | Mod: PBBFAC,,, | Performed by: STUDENT IN AN ORGANIZED HEALTH CARE EDUCATION/TRAINING PROGRAM

## 2022-04-22 PROCEDURE — 1125F PR PAIN SEVERITY QUANTIFIED, PAIN PRESENT: ICD-10-PCS | Mod: CPTII,S$GLB,, | Performed by: STUDENT IN AN ORGANIZED HEALTH CARE EDUCATION/TRAINING PROGRAM

## 2022-04-22 PROCEDURE — 3008F BODY MASS INDEX DOCD: CPT | Mod: CPTII,S$GLB,, | Performed by: STUDENT IN AN ORGANIZED HEALTH CARE EDUCATION/TRAINING PROGRAM

## 2022-04-22 PROCEDURE — 3078F PR MOST RECENT DIASTOLIC BLOOD PRESSURE < 80 MM HG: ICD-10-PCS | Mod: CPTII,S$GLB,, | Performed by: STUDENT IN AN ORGANIZED HEALTH CARE EDUCATION/TRAINING PROGRAM

## 2022-04-22 PROCEDURE — 3288F PR FALLS RISK ASSESSMENT DOCUMENTED: ICD-10-PCS | Mod: CPTII,S$GLB,, | Performed by: STUDENT IN AN ORGANIZED HEALTH CARE EDUCATION/TRAINING PROGRAM

## 2022-04-22 PROCEDURE — 99214 OFFICE O/P EST MOD 30 MIN: CPT | Mod: S$GLB,,, | Performed by: STUDENT IN AN ORGANIZED HEALTH CARE EDUCATION/TRAINING PROGRAM

## 2022-04-22 PROCEDURE — 3078F DIAST BP <80 MM HG: CPT | Mod: CPTII,S$GLB,, | Performed by: STUDENT IN AN ORGANIZED HEALTH CARE EDUCATION/TRAINING PROGRAM

## 2022-04-22 PROCEDURE — 1160F RVW MEDS BY RX/DR IN RCRD: CPT | Mod: CPTII,S$GLB,, | Performed by: STUDENT IN AN ORGANIZED HEALTH CARE EDUCATION/TRAINING PROGRAM

## 2022-04-22 RX ORDER — ALBUTEROL SULFATE 90 UG/1
2 AEROSOL, METERED RESPIRATORY (INHALATION) EVERY 4 HOURS PRN
COMMUNITY

## 2022-04-22 RX ORDER — NAPROXEN 500 MG/1
500 TABLET ORAL 2 TIMES DAILY WITH MEALS
Qty: 60 TABLET | Refills: 1 | Status: SHIPPED | OUTPATIENT
Start: 2022-04-22 | End: 2022-09-22

## 2022-04-22 RX ORDER — ALBUTEROL SULFATE 0.83 MG/ML
2.5 SOLUTION RESPIRATORY (INHALATION) EVERY 6 HOURS PRN
COMMUNITY

## 2022-04-22 RX ORDER — CYCLOBENZAPRINE HCL 5 MG
5-10 TABLET ORAL 3 TIMES DAILY PRN
Qty: 50 TABLET | Refills: 1 | Status: SHIPPED | OUTPATIENT
Start: 2022-04-22 | End: 2022-09-22

## 2022-04-22 NOTE — PROGRESS NOTES
"Subjective:           Patient ID: Dea Newton is a 66 y.o. female who presents today with a chief complaint of est care.    Chief Complaint:   Knee Pain (Right knee) and Establish Care      History of Present Illness:    67yo female presenting for est care and to address her right leg pain.    Has been having pain in the right leg for a long long time.  Has gotten fluid on her knee.  At [facility] they tara fluid off her knee and since then has had a focal pain in the right knee at that locations.    Has been getting really bad lately.     Has tried biofreeze, theragesic, Tylenol XR and none have helped.       GERD:  Had been started on PPI and it controlled her acid reflux well.   Has not been using daily, but gets back on the med if pain returns.    Reports vaginal odor and discharge.  Urinary urgency.      Review of Systems   Constitutional: Negative for activity change, fatigue, fever and unexpected weight change.   HENT: Negative for congestion, nosebleeds, sinus pressure and sneezing.    Respiratory: Negative for cough, shortness of breath and wheezing.    Cardiovascular: Negative for chest pain, palpitations and leg swelling.   Gastrointestinal: Negative for abdominal distention, constipation, diarrhea and nausea.   Endocrine: Positive for polydipsia and polyuria.   Genitourinary: Positive for difficulty urinating and frequency. Negative for dysuria.   Musculoskeletal: Positive for arthralgias, gait problem and joint swelling. Negative for back pain.   Skin: Negative for pallor and rash.   Neurological: Negative for weakness, numbness and headaches.   Psychiatric/Behavioral: Negative for agitation. The patient is not nervous/anxious.            Objective:        Vitals:    04/22/22 1505   BP: 120/68   BP Location: Right arm   Patient Position: Sitting   BP Method: Large (Manual)   Pulse: 61   Resp: 18   Temp: 98.2 °F (36.8 °C)   TempSrc: Oral   SpO2: 99%   Weight: 131.5 kg (289 lb 14.5 oz)   Height: 5' 1" (1.549 " m)       Body mass index is 54.78 kg/m².      Physical Exam  Constitutional:       General: She is not in acute distress.     Appearance: Normal appearance. She is obese.   HENT:      Head: Normocephalic and atraumatic.      Right Ear: External ear normal.      Left Ear: External ear normal.      Nose: No rhinorrhea.      Mouth/Throat:      Mouth: Mucous membranes are moist.   Eyes:      Extraocular Movements: Extraocular movements intact.      Conjunctiva/sclera: Conjunctivae normal.   Cardiovascular:      Rate and Rhythm: Normal rate.   Pulmonary:      Effort: Pulmonary effort is normal. No respiratory distress.   Abdominal:      Tenderness: There is no right CVA tenderness or left CVA tenderness.   Musculoskeletal:         General: Tenderness present.      Right lower leg: Edema present.      Left lower leg: Edema present.      Comments: Right knee pain, TTP at medial aspect.    Skin:     Capillary Refill: Capillary refill takes less than 2 seconds.      Coloration: Skin is not jaundiced.      Findings: No bruising.   Neurological:      General: No focal deficit present.      Mental Status: She is alert and oriented to person, place, and time.      Motor: No weakness.      Gait: Gait normal.   Psychiatric:         Mood and Affect: Mood normal.             Lab Results   Component Value Date     01/13/2022    K 4.5 01/13/2022     01/13/2022    CO2 25 01/13/2022    BUN 13 01/13/2022    CREATININE 0.8 01/13/2022    ANIONGAP 13 01/13/2022     No results found for: HGBA1C  Lab Results   Component Value Date     (H) 05/10/2018       Lab Results   Component Value Date    WBC 7.47 01/13/2022    HGB 12.8 01/13/2022    HCT 42.1 01/13/2022     01/13/2022    GRAN 2.9 01/13/2022    GRAN 38.5 01/13/2022     Lab Results   Component Value Date    CHOL 195 01/13/2022    HDL 48 01/13/2022    LDLCALC 135.0 01/13/2022    TRIG 60 01/13/2022          Current Outpatient Medications:     albuterol (PROVENTIL)  2.5 mg /3 mL (0.083 %) nebulizer solution, Take 2.5 mg by nebulization every 6 (six) hours as needed for Wheezing. Rescue, Disp: , Rfl:     albuterol (PROVENTIL/VENTOLIN HFA) 90 mcg/actuation inhaler, Inhale 2 puffs into the lungs every 4 (four) hours as needed for Wheezing. Rescue, Disp: , Rfl:     cyclobenzaprine (FLEXERIL) 5 MG tablet, Take 1-2 tablets (5-10 mg total) by mouth 3 (three) times daily as needed for Muscle spasms., Disp: 50 tablet, Rfl: 1    naproxen (NAPROSYN) 500 MG tablet, Take 1 tablet (500 mg total) by mouth 2 (two) times daily with meals., Disp: 60 tablet, Rfl: 1     Outpatient Encounter Medications as of 4/22/2022   Medication Sig Dispense Refill    albuterol (PROVENTIL) 2.5 mg /3 mL (0.083 %) nebulizer solution Take 2.5 mg by nebulization every 6 (six) hours as needed for Wheezing. Rescue      albuterol (PROVENTIL/VENTOLIN HFA) 90 mcg/actuation inhaler Inhale 2 puffs into the lungs every 4 (four) hours as needed for Wheezing. Rescue      cyclobenzaprine (FLEXERIL) 5 MG tablet Take 1-2 tablets (5-10 mg total) by mouth 3 (three) times daily as needed for Muscle spasms. 50 tablet 1    naproxen (NAPROSYN) 500 MG tablet Take 1 tablet (500 mg total) by mouth 2 (two) times daily with meals. 60 tablet 1    [DISCONTINUED] pantoprazole (PROTONIX) 40 MG tablet Take 1 tablet (40 mg total) by mouth once daily. 30 tablet 1     No facility-administered encounter medications on file as of 4/22/2022.          Assessment:       1. Chronic pain of right knee    2. Encounter to establish care    3. Morbid obesity with body mass index (BMI) of 50.0 to 59.9 in adult    4. Vaginal odor    5. Urinary frequency    6. Left-sided Bell's palsy    7. Pterygium eye, bilateral    8. Screening mammogram, encounter for    9. Colon cancer screening    10. Asymptomatic menopausal state    11. Elevated glucose    12. Sarcoidosis           Plan:       Chronic pain of right knee  -     naproxen (NAPROSYN) 500 MG tablet;  Take 1 tablet (500 mg total) by mouth 2 (two) times daily with meals.  Dispense: 60 tablet; Refill: 1  -     cyclobenzaprine (FLEXERIL) 5 MG tablet; Take 1-2 tablets (5-10 mg total) by mouth 3 (three) times daily as needed for Muscle spasms.  Dispense: 50 tablet; Refill: 1  -     X-Ray Knee 3 View Right; Future; Expected date: 04/22/2022    Encounter to establish care    Morbid obesity with body mass index (BMI) of 50.0 to 59.9 in adult    Vaginal odor  -     VAGINOSIS SCREEN BY DNA PROBE    Urinary frequency    Left-sided Bell's palsy    Pterygium eye, bilateral    Screening mammogram, encounter for  -     Mammo Digital Screening Bilat; Future; Expected date: 04/22/2022    Colon cancer screening    Asymptomatic menopausal state  -     DXA Bone Density Spine And Hip; Future; Expected date: 04/22/2022    Elevated glucose  -     Hemoglobin A1C; Future; Expected date: 04/22/2022    Sarcoidosis       Est Care:   - patient new to this provider.     - getting baseline labs.    Health Screening:   - patient states she has never had a mammogram or Dexa, both ordered today.     Right Knee Pain:   - patient right knee pain, not improving, recurrent issue since previous knee aspiration at [facility].   - exam shows no laxiety in knee with drawer testing.    - advised Naproxen 500mg BID daily for 1 week, then as needed.   - Meloxicam 5-10mg as needed up to 3 times daily. Advised 5mg on first use, and primarily using 10mg at night to aid sleep.    - getting knee xray for possible OA.     - if not improving in couple weeks, would consider PT or joint injection.    Urinary Frequency:   - patient having polyuria, polydypsia and nocturia.  Is obese, has DM in family and had an elevated glucose level previously.    - getting A1c.     Vaginal Odor:   - getting Affirm for BV vs Yeast infection.    Sarcoid:   - reports dry eyes.   - hx of chronic Prednisone 10mg at times before when sarcoid is in exacerbation.     Weight Loss:   - Body mass  "index is 54.78 kg/m².   - Normal weight is BMI 18-25, Overweight 25-30, and Obesity is 30+.   - would recommend weight loss for improved overall health.   - recommended moderate weight change, 1-2lbs per weeks.   - focus on eating a healthy sustainable diet.  Use food diary.   - consider jalyn such as "Lose It" or "Noom".   - avoid empty calories that you may use daily from items such as like soda, sweet tea, sugary coffee, ice cream or candy.  An occasional piece of birthday cake is not the cause of obesity, but a daily Frappaccino could be to blame.    - Exercise has many benefits (heart health, improved mood/energy, higher self esteem, less depression, greater strength/flexibility, better sleep, less stress/anxiety, improved immune system, stronger bones, improved cognition, fewer colds/asthma exacerbations), it also does help lose weight.  But weight loss from exercise is much less impactful than when a change in diet can achieve.  Exercise is highly encouraged, but diet change should be the primary tool used to lose weight.                 "

## 2022-04-22 NOTE — PATIENT INSTRUCTIONS
"  Est Care:   - patient new to this provider.     - getting baseline labs.    Health Screening:   - patient states she has never had a mammogram or Dexa, both ordered today.     Right Knee Pain:   - patient right knee pain, not improving, recurrent issue since previous knee aspiration at [facility].   - exam shows no laxiety in knee with drawer testing.    - advised Naproxen 500mg BID daily for 1 week, then as needed.   - Meloxicam 5-10mg as needed up to 3 times daily. Advised 5mg on first use, and primarily using 10mg at night to aid sleep.    - getting knee xray for possible OA.     - if not improving in couple weeks, would consider PT or joint injection.    Urinary Frequency:   - patient having polyuria, polydypsia and nocturia.  Is obese, has DM in family and had an elevated glucose level previously.    - getting A1c.     Vaginal Odor:   - getting Affirm for BV vs Yeast infection.    Weight Loss:   - Body mass index is 54.78 kg/m².   - Normal weight is BMI 18-25, Overweight 25-30, and Obesity is 30+.   - would recommend weight loss for improved overall health.   - recommended moderate weight change, 1-2lbs per weeks.   - focus on eating a healthy sustainable diet.  Use food diary.   - consider jalyn such as "Lose It" or "Noom".   - avoid empty calories that you may use daily from items such as like soda, sweet tea, sugary coffee, ice cream or candy.  An occasional piece of birthday cake is not the cause of obesity, but a daily Frappaccino could be to blame.    - Exercise has many benefits (heart health, improved mood/energy, higher self esteem, less depression, greater strength/flexibility, better sleep, less stress/anxiety, improved immune system, stronger bones, improved cognition, fewer colds/asthma exacerbations), it also does help lose weight.  But weight loss from exercise is much less impactful than when a change in diet can achieve.  Exercise is highly encouraged, but diet change should be the primary tool " used to lose weight.

## 2022-04-24 LAB
BACTERIAL VAGINOSIS DNA: NEGATIVE
CANDIDA GLABRATA DNA: NEGATIVE
CANDIDA KRUSEI DNA: NEGATIVE
CANDIDA RRNA VAG QL PROBE: NEGATIVE
T VAGINALIS RRNA GENITAL QL PROBE: NEGATIVE

## 2022-04-26 DIAGNOSIS — M17.12 TRICOMPARTMENT OSTEOARTHRITIS OF LEFT KNEE: Primary | ICD-10-CM

## 2022-05-11 ENCOUNTER — TELEPHONE (OUTPATIENT)
Dept: ORTHOPEDICS | Facility: CLINIC | Age: 67
End: 2022-05-11
Payer: MEDICARE

## 2022-05-11 DIAGNOSIS — M25.561 RIGHT KNEE PAIN, UNSPECIFIED CHRONICITY: Primary | ICD-10-CM

## 2022-05-11 NOTE — TELEPHONE ENCOUNTER
Spoke with pt. Advised pt that she will need xrays prior to her appt. Images ordered and scheduled. All questions answered. Pt verbalized understanding.

## 2022-05-13 ENCOUNTER — PATIENT OUTREACH (OUTPATIENT)
Dept: ADMINISTRATIVE | Facility: OTHER | Age: 67
End: 2022-05-13
Payer: MEDICARE

## 2022-05-13 NOTE — PROGRESS NOTES
Health Maintenance Due   Topic Date Due    Cervical Cancer Screening  Never done    COVID-19 Vaccine (1) Never done    TETANUS VACCINE  Never done    Mammogram  Never done    DEXA Scan  Never done    Shingles Vaccine (1 of 2) Never done    Pneumococcal Vaccines (Age 65+) (2 - PCV) 02/11/2021     Updates were requested from care everywhere.  DIS/Chart was reviewed for overdue Proactive [facility] Encounters (MIS) topics (CRS, Breast Cancer Screening, Eye exam)  Health Maintenance has been updated.  LINKS immunization registry triggered.  Immunizations were reconciled.

## 2022-05-17 ENCOUNTER — OFFICE VISIT (OUTPATIENT)
Dept: ORTHOPEDICS | Facility: CLINIC | Age: 67
End: 2022-05-17
Payer: MEDICARE

## 2022-05-17 VITALS
WEIGHT: 290.38 LBS | HEIGHT: 60 IN | DIASTOLIC BLOOD PRESSURE: 77 MMHG | HEART RATE: 54 BPM | BODY MASS INDEX: 57.01 KG/M2 | SYSTOLIC BLOOD PRESSURE: 170 MMHG

## 2022-05-17 DIAGNOSIS — M17.12 TRICOMPARTMENT OSTEOARTHRITIS OF LEFT KNEE: ICD-10-CM

## 2022-05-17 DIAGNOSIS — M17.11 PRIMARY OSTEOARTHRITIS OF RIGHT KNEE: Primary | ICD-10-CM

## 2022-05-17 PROCEDURE — 99999 PR PBB SHADOW E&M-EST. PATIENT-LVL III: CPT | Mod: PBBFAC,,, | Performed by: ORTHOPAEDIC SURGERY

## 2022-05-17 PROCEDURE — 1160F PR REVIEW ALL MEDS BY PRESCRIBER/CLIN PHARMACIST DOCUMENTED: ICD-10-PCS | Mod: CPTII,S$GLB,, | Performed by: ORTHOPAEDIC SURGERY

## 2022-05-17 PROCEDURE — 3044F HG A1C LEVEL LT 7.0%: CPT | Mod: CPTII,S$GLB,, | Performed by: ORTHOPAEDIC SURGERY

## 2022-05-17 PROCEDURE — 1159F MED LIST DOCD IN RCRD: CPT | Mod: CPTII,S$GLB,, | Performed by: ORTHOPAEDIC SURGERY

## 2022-05-17 PROCEDURE — 1101F PR PT FALLS ASSESS DOC 0-1 FALLS W/OUT INJ PAST YR: ICD-10-PCS | Mod: CPTII,S$GLB,, | Performed by: ORTHOPAEDIC SURGERY

## 2022-05-17 PROCEDURE — 99999 PR PBB SHADOW E&M-EST. PATIENT-LVL III: ICD-10-PCS | Mod: PBBFAC,,, | Performed by: ORTHOPAEDIC SURGERY

## 2022-05-17 PROCEDURE — 1125F PR PAIN SEVERITY QUANTIFIED, PAIN PRESENT: ICD-10-PCS | Mod: CPTII,S$GLB,, | Performed by: ORTHOPAEDIC SURGERY

## 2022-05-17 PROCEDURE — 20610 LARGE JOINT ASPIRATION/INJECTION: R KNEE: ICD-10-PCS | Mod: RT,S$GLB,, | Performed by: ORTHOPAEDIC SURGERY

## 2022-05-17 PROCEDURE — 3008F PR BODY MASS INDEX (BMI) DOCUMENTED: ICD-10-PCS | Mod: CPTII,S$GLB,, | Performed by: ORTHOPAEDIC SURGERY

## 2022-05-17 PROCEDURE — 1159F PR MEDICATION LIST DOCUMENTED IN MEDICAL RECORD: ICD-10-PCS | Mod: CPTII,S$GLB,, | Performed by: ORTHOPAEDIC SURGERY

## 2022-05-17 PROCEDURE — 1125F AMNT PAIN NOTED PAIN PRSNT: CPT | Mod: CPTII,S$GLB,, | Performed by: ORTHOPAEDIC SURGERY

## 2022-05-17 PROCEDURE — 3288F FALL RISK ASSESSMENT DOCD: CPT | Mod: CPTII,S$GLB,, | Performed by: ORTHOPAEDIC SURGERY

## 2022-05-17 PROCEDURE — 3078F DIAST BP <80 MM HG: CPT | Mod: CPTII,S$GLB,, | Performed by: ORTHOPAEDIC SURGERY

## 2022-05-17 PROCEDURE — 3077F PR MOST RECENT SYSTOLIC BLOOD PRESSURE >= 140 MM HG: ICD-10-PCS | Mod: CPTII,S$GLB,, | Performed by: ORTHOPAEDIC SURGERY

## 2022-05-17 PROCEDURE — 3044F PR MOST RECENT HEMOGLOBIN A1C LEVEL <7.0%: ICD-10-PCS | Mod: CPTII,S$GLB,, | Performed by: ORTHOPAEDIC SURGERY

## 2022-05-17 PROCEDURE — 20610 DRAIN/INJ JOINT/BURSA W/O US: CPT | Mod: RT,S$GLB,, | Performed by: ORTHOPAEDIC SURGERY

## 2022-05-17 PROCEDURE — 1160F RVW MEDS BY RX/DR IN RCRD: CPT | Mod: CPTII,S$GLB,, | Performed by: ORTHOPAEDIC SURGERY

## 2022-05-17 PROCEDURE — 99203 PR OFFICE/OUTPT VISIT, NEW, LEVL III, 30-44 MIN: ICD-10-PCS | Mod: 25,S$GLB,, | Performed by: ORTHOPAEDIC SURGERY

## 2022-05-17 PROCEDURE — 1101F PT FALLS ASSESS-DOCD LE1/YR: CPT | Mod: CPTII,S$GLB,, | Performed by: ORTHOPAEDIC SURGERY

## 2022-05-17 PROCEDURE — 99203 OFFICE O/P NEW LOW 30 MIN: CPT | Mod: 25,S$GLB,, | Performed by: ORTHOPAEDIC SURGERY

## 2022-05-17 PROCEDURE — 3078F PR MOST RECENT DIASTOLIC BLOOD PRESSURE < 80 MM HG: ICD-10-PCS | Mod: CPTII,S$GLB,, | Performed by: ORTHOPAEDIC SURGERY

## 2022-05-17 PROCEDURE — 3008F BODY MASS INDEX DOCD: CPT | Mod: CPTII,S$GLB,, | Performed by: ORTHOPAEDIC SURGERY

## 2022-05-17 PROCEDURE — 3077F SYST BP >= 140 MM HG: CPT | Mod: CPTII,S$GLB,, | Performed by: ORTHOPAEDIC SURGERY

## 2022-05-17 PROCEDURE — 3288F PR FALLS RISK ASSESSMENT DOCUMENTED: ICD-10-PCS | Mod: CPTII,S$GLB,, | Performed by: ORTHOPAEDIC SURGERY

## 2022-05-17 RX ORDER — TRIAMCINOLONE ACETONIDE 40 MG/ML
40 INJECTION, SUSPENSION INTRA-ARTICULAR; INTRAMUSCULAR
Status: DISCONTINUED | OUTPATIENT
Start: 2022-05-17 | End: 2022-05-17 | Stop reason: HOSPADM

## 2022-05-17 RX ADMIN — TRIAMCINOLONE ACETONIDE 40 MG: 40 INJECTION, SUSPENSION INTRA-ARTICULAR; INTRAMUSCULAR at 11:05

## 2022-05-17 NOTE — PROGRESS NOTES
Subjective:       Patient ID: Dea Newton is a 66 y.o. female.    Chief Complaint:   Pain of the Right Knee  She comes in today for an initial visit regarding pain in the right knee.  The right side hurts much worse than the left.  This is been getting worse for years.  She has been on a Rollator walker for about 3 years.  She has lot of trouble going up the stairs and getting up from a low seat.  She was recently tried on a regular dose of naproxen which she did not feel was helpful.  No groin pain, distal numbness or tingling.  No fall, accident, injury, history of pertinent surgery.    Past Medical History:   Diagnosis Date    Anxiety     Asthma     Bell's palsy     Depression     Sarcoidosis     Vitiligo      Past Surgical History:   Procedure Laterality Date     SECTION      CHOLECYSTECTOMY      ELBOW SURGERY      SHOULDER SURGERY Right     arm repair     Family History   Problem Relation Age of Onset    Diabetes Mother     Hypertension Mother     Kidney failure Mother     Dementia Father     COPD Father     Diabetes Sister     Heart attack Neg Hx     Stroke Neg Hx     Colon cancer Neg Hx     Lung cancer Neg Hx     Breast cancer Neg Hx      Social History     Socioeconomic History    Marital status:     Number of children: 9    Highest education level: Some college, no degree   Occupational History    Occupation: retired      Comment: , housekeeping, deli    Tobacco Use    Smoking status: Never Smoker    Smokeless tobacco: Never Used   Substance and Sexual Activity    Alcohol use: No    Drug use: No    Sexual activity: Not Currently   Social History Narrative    Lives in section 8 housing in [place]    Now guardian of 4 grandchildren     Mother of 9 children - 2 are .     Irma: nondenomination     Has a lot of family support        Current Outpatient Medications   Medication Sig Dispense Refill    albuterol (PROVENTIL) 2.5 mg /3 mL (0.083 %)  nebulizer solution Take 2.5 mg by nebulization every 6 (six) hours as needed for Wheezing. Rescue      albuterol (PROVENTIL/VENTOLIN HFA) 90 mcg/actuation inhaler Inhale 2 puffs into the lungs every 4 (four) hours as needed for Wheezing. Rescue      cyclobenzaprine (FLEXERIL) 5 MG tablet Take 1-2 tablets (5-10 mg total) by mouth 3 (three) times daily as needed for Muscle spasms. 50 tablet 1    naproxen (NAPROSYN) 500 MG tablet Take 1 tablet (500 mg total) by mouth 2 (two) times daily with meals. 60 tablet 1     No current facility-administered medications for this visit.     Review of patient's allergies indicates:  No Known Allergies      Objective:      Vitals:    05/17/22 1146   BP: (!) 170/77   BP Location: Right arm   Patient Position: Sitting   BP Method: Medium (Automatic)   Pulse: (!) 54   Weight: 131.7 kg (290 lb 5.5 oz)   Height: 5' (1.524 m)     Physical Exam  BMI is 56.7.  Right knee:  There is varus deformity, which is not passively correctable.  Active range of motion is 5-90 degrees.  Anterior crepitus with active extension.  Patellar mobility is limited.  Boggy synovitis without effusion.  Medial joint line tenderness predominates.  No instability to varus/valgus/Lachman's stressing.  No pain in the groin with flexion and internal rotation of the hip which is not limited.  Skin intact.  Distal neurovascular intact.  Flip test negative.    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  No acute findings.  Assessment:   DJD, right knee, with synovitis  Plan:       We did give her a cortisone injection today.  I explained to her that her BMI was too high to consider knee replacement surgery, and that her knee pain should provide additional motivation for her to work on her weight.  She will get back in touch with her primary care doctor to discuss this further.  See us as needed.  She understands injections can be repeated as often as  every 3 months.    The patient's pathophysiology was explained in detail with reference to x-rays, models, other visual aids as appropriate.  Treatment options were discussed in detail.  Questions were invited and answered to the patient's satisfaction.    Leo Wiseman MD  Orthopaedic Surgery

## 2022-05-17 NOTE — PROCEDURES
Large Joint Aspiration/Injection: R knee    Date/Time: 5/17/2022 11:15 AM  Performed by: Leo Wiseman MD  Authorized by: Leo Wiseman MD     Consent Done?:  Yes (Verbal)  Indications:  Pain and arthritis  Site marked: the procedure site was marked    Timeout: prior to procedure the correct patient, procedure, and site was verified    Prep: patient was prepped and draped in usual sterile fashion      Local anesthesia used?: Yes    Local anesthetic:  Bupivacaine 0.25% without epinephrine  Anesthetic total (ml):  5      Details:  Needle Size:  25 G  Ultrasonic Guidance for needle placement?: No    Approach:  Anterolateral  Location:  Knee  Site:  R knee  Medications:  40 mg triamcinolone acetonide 40 mg/mL  Patient tolerance:  Patient tolerated the procedure well with no immediate complications

## 2022-07-06 ENCOUNTER — OFFICE VISIT (OUTPATIENT)
Dept: OBSTETRICS AND GYNECOLOGY | Facility: CLINIC | Age: 67
End: 2022-07-06
Payer: MEDICARE

## 2022-07-06 VITALS
DIASTOLIC BLOOD PRESSURE: 95 MMHG | BODY MASS INDEX: 57.35 KG/M2 | SYSTOLIC BLOOD PRESSURE: 145 MMHG | HEIGHT: 60 IN | WEIGHT: 292.13 LBS

## 2022-07-06 DIAGNOSIS — N89.8 VAGINAL ODOR: ICD-10-CM

## 2022-07-06 DIAGNOSIS — Z01.419 ENCOUNTER FOR WELL WOMAN EXAM WITH ROUTINE GYNECOLOGICAL EXAM: Primary | ICD-10-CM

## 2022-07-06 DIAGNOSIS — N95.0 PMB (POSTMENOPAUSAL BLEEDING): ICD-10-CM

## 2022-07-06 PROCEDURE — 99999 PR PBB SHADOW E&M-EST. PATIENT-LVL III: ICD-10-PCS | Mod: PBBFAC,,, | Performed by: NURSE PRACTITIONER

## 2022-07-06 PROCEDURE — 88175 CYTOPATH C/V AUTO FLUID REDO: CPT | Performed by: NURSE PRACTITIONER

## 2022-07-06 PROCEDURE — 87624 HPV HI-RISK TYP POOLED RSLT: CPT | Performed by: NURSE PRACTITIONER

## 2022-07-06 PROCEDURE — 87801 DETECT AGNT MULT DNA AMPLI: CPT | Performed by: NURSE PRACTITIONER

## 2022-07-06 PROCEDURE — 3077F PR MOST RECENT SYSTOLIC BLOOD PRESSURE >= 140 MM HG: ICD-10-PCS | Mod: CPTII,S$GLB,, | Performed by: NURSE PRACTITIONER

## 2022-07-06 PROCEDURE — 1159F MED LIST DOCD IN RCRD: CPT | Mod: CPTII,S$GLB,, | Performed by: NURSE PRACTITIONER

## 2022-07-06 PROCEDURE — 3044F PR MOST RECENT HEMOGLOBIN A1C LEVEL <7.0%: ICD-10-PCS | Mod: CPTII,S$GLB,, | Performed by: NURSE PRACTITIONER

## 2022-07-06 PROCEDURE — 3044F HG A1C LEVEL LT 7.0%: CPT | Mod: CPTII,S$GLB,, | Performed by: NURSE PRACTITIONER

## 2022-07-06 PROCEDURE — G0101 CA SCREEN;PELVIC/BREAST EXAM: HCPCS | Mod: S$GLB,,, | Performed by: NURSE PRACTITIONER

## 2022-07-06 PROCEDURE — 1101F PT FALLS ASSESS-DOCD LE1/YR: CPT | Mod: CPTII,S$GLB,, | Performed by: NURSE PRACTITIONER

## 2022-07-06 PROCEDURE — 1126F PR PAIN SEVERITY QUANTIFIED, NO PAIN PRESENT: ICD-10-PCS | Mod: CPTII,S$GLB,, | Performed by: NURSE PRACTITIONER

## 2022-07-06 PROCEDURE — 3080F DIAST BP >= 90 MM HG: CPT | Mod: CPTII,S$GLB,, | Performed by: NURSE PRACTITIONER

## 2022-07-06 PROCEDURE — 1160F RVW MEDS BY RX/DR IN RCRD: CPT | Mod: CPTII,S$GLB,, | Performed by: NURSE PRACTITIONER

## 2022-07-06 PROCEDURE — 1126F AMNT PAIN NOTED NONE PRSNT: CPT | Mod: CPTII,S$GLB,, | Performed by: NURSE PRACTITIONER

## 2022-07-06 PROCEDURE — 1101F PR PT FALLS ASSESS DOC 0-1 FALLS W/OUT INJ PAST YR: ICD-10-PCS | Mod: CPTII,S$GLB,, | Performed by: NURSE PRACTITIONER

## 2022-07-06 PROCEDURE — 99999 PR PBB SHADOW E&M-EST. PATIENT-LVL III: CPT | Mod: PBBFAC,,, | Performed by: NURSE PRACTITIONER

## 2022-07-06 PROCEDURE — G0101 PR CA SCREEN;PELVIC/BREAST EXAM: ICD-10-PCS | Mod: S$GLB,,, | Performed by: NURSE PRACTITIONER

## 2022-07-06 PROCEDURE — 3008F BODY MASS INDEX DOCD: CPT | Mod: CPTII,S$GLB,, | Performed by: NURSE PRACTITIONER

## 2022-07-06 PROCEDURE — 3288F FALL RISK ASSESSMENT DOCD: CPT | Mod: CPTII,S$GLB,, | Performed by: NURSE PRACTITIONER

## 2022-07-06 PROCEDURE — 1160F PR REVIEW ALL MEDS BY PRESCRIBER/CLIN PHARMACIST DOCUMENTED: ICD-10-PCS | Mod: CPTII,S$GLB,, | Performed by: NURSE PRACTITIONER

## 2022-07-06 PROCEDURE — 3077F SYST BP >= 140 MM HG: CPT | Mod: CPTII,S$GLB,, | Performed by: NURSE PRACTITIONER

## 2022-07-06 PROCEDURE — 1159F PR MEDICATION LIST DOCUMENTED IN MEDICAL RECORD: ICD-10-PCS | Mod: CPTII,S$GLB,, | Performed by: NURSE PRACTITIONER

## 2022-07-06 PROCEDURE — 3288F PR FALLS RISK ASSESSMENT DOCUMENTED: ICD-10-PCS | Mod: CPTII,S$GLB,, | Performed by: NURSE PRACTITIONER

## 2022-07-06 PROCEDURE — 87481 CANDIDA DNA AMP PROBE: CPT | Mod: 59 | Performed by: NURSE PRACTITIONER

## 2022-07-06 PROCEDURE — 3080F PR MOST RECENT DIASTOLIC BLOOD PRESSURE >= 90 MM HG: ICD-10-PCS | Mod: CPTII,S$GLB,, | Performed by: NURSE PRACTITIONER

## 2022-07-06 PROCEDURE — 3008F PR BODY MASS INDEX (BMI) DOCUMENTED: ICD-10-PCS | Mod: CPTII,S$GLB,, | Performed by: NURSE PRACTITIONER

## 2022-07-06 NOTE — PROGRESS NOTES
Chief Complaint: Well Woman Exam     HPI:      Dea Newton is a 66 y.o.  who presents today for well woman exam.  Patient is postmenopausal. Patient is not currently sexually active. She declines STD screening today. Ms. Newton confirms that she is safe at home.  Ms. Newton denies abnormal vaginal discharge, pelvic pain, urinary problems, or changes in appetite.  Patient reports intermittent vaginal odor x 2-3 months. She is currently experiencing symptoms.     Patient reports 1 week of heavy vaginal bleeding 3 weeks ago. She reports first episode vaginal bleeding 6 months ago. She states she had a transvaginal ultrasound completed [facility] about 4 months ago but I cannot locate the appt or report. Patient states she was told she has fibroids.     Last pap smear > 10 years ago.  Denies hx of abnormal paps  No Previous Mammogram  No Recent Dexa  Colonoscopy: no previous colonoscopy documented    Family History   Problem Relation Age of Onset    Diabetes Mother     Hypertension Mother     Kidney failure Mother     Dementia Father     COPD Father     Diabetes Sister     Heart attack Neg Hx     Stroke Neg Hx     Colon cancer Neg Hx     Lung cancer Neg Hx     Breast cancer Neg Hx      OB History        9    Para   9    Term   9       0    AB   0    Living   9       SAB   0    IAB   0    Ectopic   0    Multiple   2    Live Births   9                 ROS:     GENERAL: Denies unintentional weight gain or weight loss. Feeling well overall.   BREASTS: Denies pain, lumps, or nipple discharge.   ABDOMEN: Denies abdominal pain, constipation, diarrhea, nausea, vomiting, change in appetite.  URINARY: Denies frequency, dysuria, hematuria.  PSYCHIATRIC: Denies depression, anxiety or mood swings.    Physical Exam:      PHYSICAL EXAM:  BP (!) 145/95 (BP Location: Right arm)   Ht 5' (1.524 m)   Wt 132.5 kg (292 lb 1.8 oz)   BMI 57.05 kg/m²   Body mass index is 57.05 kg/m².      APPEARANCE: Well nourished, obese, in no acute distress.  PSYCH: Appropriate mood and affect.  ABDOMEN: Soft.  No tenderness or masses.    BREASTS: Symmetrical, no skin changes or visible lesions.  No palpable masses or nipple discharge bilaterally.  PELVIC: Normal external genitalia without lesions.  Normal hair distribution.  Adequate perineal body, normal urethral meatus.  Vagina moist,  without lesions or discharge.  Cervix pink, without lesions, discharge or tenderness.  No significant cystocele or rectocele.  Bimanual exam shows uterus to be normal size, regular, mobile and nontender.  Adnexa without masses or tenderness.    Bimanual limited due to habitus  Assessment/Plan:     Encounter for well woman exam with routine gynecological exam  -     Liquid-Based Pap Smear, Screening  -     HPV High Risk Genotypes, PCR    PMB (postmenopausal bleeding)    Vaginal odor  -     Vaginosis Screen by DNA Probe        Counseling:     Patient was counseled today on current ASCCP pap guidelines, the recommendation for yearly pelvic exams, healthy diet and exercise routines, breast self awareness and annual mammograms.She is to see her PCP for other health maintenance.     Patient signed release of info and will call office with name of provider that ordered pelvic US.   Discussed recommendation for EMBX.

## 2022-07-11 ENCOUNTER — TELEPHONE (OUTPATIENT)
Dept: OBSTETRICS AND GYNECOLOGY | Facility: CLINIC | Age: 67
End: 2022-07-11
Payer: MEDICARE

## 2022-07-11 NOTE — TELEPHONE ENCOUNTER
Reached out to pt in regards to discuss test results. Results given and pt VU     ----- Message from ROSE Carvajal sent at 7/11/2022  1:43 PM CDT -----  Pap smear is normal.

## 2022-07-12 ENCOUNTER — TELEPHONE (OUTPATIENT)
Dept: OBSTETRICS AND GYNECOLOGY | Facility: CLINIC | Age: 67
End: 2022-07-12
Payer: MEDICARE

## 2022-07-12 NOTE — TELEPHONE ENCOUNTER
Attempted to call pt. No answer     ----- Message from ROSE Carvajal sent at 7/12/2022  2:21 PM CDT -----  PLease call patient. Negative for yeast, BV, and trich

## 2022-09-22 ENCOUNTER — OFFICE VISIT (OUTPATIENT)
Dept: PAIN MEDICINE | Facility: CLINIC | Age: 67
End: 2022-09-22
Payer: MEDICARE

## 2022-09-22 VITALS
SYSTOLIC BLOOD PRESSURE: 142 MMHG | HEIGHT: 60 IN | BODY MASS INDEX: 57.38 KG/M2 | OXYGEN SATURATION: 100 % | WEIGHT: 292.25 LBS | DIASTOLIC BLOOD PRESSURE: 77 MMHG | HEART RATE: 55 BPM

## 2022-09-22 DIAGNOSIS — M17.11 PRIMARY OSTEOARTHRITIS OF RIGHT KNEE: ICD-10-CM

## 2022-09-22 DIAGNOSIS — M25.561 CHRONIC PAIN OF RIGHT KNEE: ICD-10-CM

## 2022-09-22 DIAGNOSIS — G89.29 CHRONIC PAIN OF RIGHT KNEE: ICD-10-CM

## 2022-09-22 PROCEDURE — 1125F AMNT PAIN NOTED PAIN PRSNT: CPT | Mod: CPTII,S$GLB,, | Performed by: PAIN MEDICINE

## 2022-09-22 PROCEDURE — 99999 PR PBB SHADOW E&M-EST. PATIENT-LVL III: ICD-10-PCS | Mod: PBBFAC,,, | Performed by: PAIN MEDICINE

## 2022-09-22 PROCEDURE — 99204 PR OFFICE/OUTPT VISIT, NEW, LEVL IV, 45-59 MIN: ICD-10-PCS | Mod: S$GLB,,, | Performed by: PAIN MEDICINE

## 2022-09-22 PROCEDURE — 3077F SYST BP >= 140 MM HG: CPT | Mod: CPTII,S$GLB,, | Performed by: PAIN MEDICINE

## 2022-09-22 PROCEDURE — 3008F BODY MASS INDEX DOCD: CPT | Mod: CPTII,S$GLB,, | Performed by: PAIN MEDICINE

## 2022-09-22 PROCEDURE — 99999 PR PBB SHADOW E&M-EST. PATIENT-LVL III: CPT | Mod: PBBFAC,,, | Performed by: PAIN MEDICINE

## 2022-09-22 PROCEDURE — 3288F FALL RISK ASSESSMENT DOCD: CPT | Mod: CPTII,S$GLB,, | Performed by: PAIN MEDICINE

## 2022-09-22 PROCEDURE — 1159F MED LIST DOCD IN RCRD: CPT | Mod: CPTII,S$GLB,, | Performed by: PAIN MEDICINE

## 2022-09-22 PROCEDURE — 99213 OFFICE O/P EST LOW 20 MIN: CPT | Mod: PBBFAC,PN | Performed by: PAIN MEDICINE

## 2022-09-22 PROCEDURE — 1160F RVW MEDS BY RX/DR IN RCRD: CPT | Mod: CPTII,S$GLB,, | Performed by: PAIN MEDICINE

## 2022-09-22 PROCEDURE — 3078F PR MOST RECENT DIASTOLIC BLOOD PRESSURE < 80 MM HG: ICD-10-PCS | Mod: CPTII,S$GLB,, | Performed by: PAIN MEDICINE

## 2022-09-22 PROCEDURE — 1125F PR PAIN SEVERITY QUANTIFIED, PAIN PRESENT: ICD-10-PCS | Mod: CPTII,S$GLB,, | Performed by: PAIN MEDICINE

## 2022-09-22 PROCEDURE — 3288F PR FALLS RISK ASSESSMENT DOCUMENTED: ICD-10-PCS | Mod: CPTII,S$GLB,, | Performed by: PAIN MEDICINE

## 2022-09-22 PROCEDURE — 3078F DIAST BP <80 MM HG: CPT | Mod: CPTII,S$GLB,, | Performed by: PAIN MEDICINE

## 2022-09-22 PROCEDURE — 3044F HG A1C LEVEL LT 7.0%: CPT | Mod: CPTII,S$GLB,, | Performed by: PAIN MEDICINE

## 2022-09-22 PROCEDURE — 3077F PR MOST RECENT SYSTOLIC BLOOD PRESSURE >= 140 MM HG: ICD-10-PCS | Mod: CPTII,S$GLB,, | Performed by: PAIN MEDICINE

## 2022-09-22 PROCEDURE — 1101F PR PT FALLS ASSESS DOC 0-1 FALLS W/OUT INJ PAST YR: ICD-10-PCS | Mod: CPTII,S$GLB,, | Performed by: PAIN MEDICINE

## 2022-09-22 PROCEDURE — 1160F PR REVIEW ALL MEDS BY PRESCRIBER/CLIN PHARMACIST DOCUMENTED: ICD-10-PCS | Mod: CPTII,S$GLB,, | Performed by: PAIN MEDICINE

## 2022-09-22 PROCEDURE — 99204 OFFICE O/P NEW MOD 45 MIN: CPT | Mod: S$GLB,,, | Performed by: PAIN MEDICINE

## 2022-09-22 PROCEDURE — 3044F PR MOST RECENT HEMOGLOBIN A1C LEVEL <7.0%: ICD-10-PCS | Mod: CPTII,S$GLB,, | Performed by: PAIN MEDICINE

## 2022-09-22 PROCEDURE — 3008F PR BODY MASS INDEX (BMI) DOCUMENTED: ICD-10-PCS | Mod: CPTII,S$GLB,, | Performed by: PAIN MEDICINE

## 2022-09-22 PROCEDURE — 1101F PT FALLS ASSESS-DOCD LE1/YR: CPT | Mod: CPTII,S$GLB,, | Performed by: PAIN MEDICINE

## 2022-09-22 PROCEDURE — 1159F PR MEDICATION LIST DOCUMENTED IN MEDICAL RECORD: ICD-10-PCS | Mod: CPTII,S$GLB,, | Performed by: PAIN MEDICINE

## 2022-09-22 RX ORDER — TRIAMCINOLONE ACETONIDE 40 MG/ML
INJECTION, SUSPENSION INTRA-ARTICULAR; INTRAMUSCULAR
COMMUNITY
Start: 2022-05-17 | End: 2022-09-22 | Stop reason: ALTCHOICE

## 2022-09-22 NOTE — PROGRESS NOTES
Subjective:     Patient ID: Dea Newton is a 67 y.o. female    Chief Complaint: Knee Pain (Referred by Dr Araiza for OA of both knees)      Referred by: Shruti Araiza MD      HPI:    Initial Encounter (9/22/22):  Dea Newton is a 67 y.o. female who presents today with chronic right knee pain secondary to osteoarthritis.  This pain has been present for years.  Patient has been evaluated by Orthopedic surgery.  Not a candidate for total knee replacement secondary to BMI.  Has undergone steroid injections with minimal relief.  This pain is described in detail below.    Physical Therapy:  No.    Non-pharmacologic Treatment:  Rest helps         TENS?  No    Pain Medications:         Currently taking:  Nothing    Has tried in the past:  NSAIDs, Tylenol, muscle relaxants    Has not tried:  Opioids, TCAs, SNRIs, anticonvulsants, topical creams    Blood thinners:  None    Interventional Therapies:   Failed intra-articular knee steroid injections    Relevant Surgeries:  None    Affecting sleep?  Yes    Affecting daily activities? yes    Depressive symptoms? no          SI/HI? No    Work status: Disabled    Pain Scores:    Best:       9/10  Worst:     10/10  Usually:   9/10  Today:    9/10    Pain Disability Index  Family/Home Responsibilities:: 10  Recreation:: 9  Social Activity:: 9  Occupation:: 9  Sexual Behavior:: 0  Self Care:: 9  Life-Support Activities:: 9  Pain Disability Index (PDI): 55    Review of Systems   Constitutional:  Negative for activity change, appetite change, chills, fatigue, fever and unexpected weight change.   HENT:  Negative for hearing loss.    Eyes:  Negative for visual disturbance.   Respiratory:  Negative for chest tightness and shortness of breath.    Cardiovascular:  Negative for chest pain.   Gastrointestinal:  Negative for abdominal pain, constipation, diarrhea, nausea and vomiting.   Genitourinary:  Negative for difficulty urinating.   Musculoskeletal:  Positive for  arthralgias, gait problem and myalgias. Negative for back pain and neck pain.   Skin:  Negative for rash.   Neurological:  Negative for dizziness, weakness, light-headedness, numbness and headaches.   Psychiatric/Behavioral:  Positive for sleep disturbance. Negative for hallucinations and suicidal ideas. The patient is not nervous/anxious.      Past Medical History:   Diagnosis Date    Anxiety     Asthma     Bell's palsy     Depression     Fibromyalgia     Hypertension     Sarcoidosis     Vitiligo        Past Surgical History:   Procedure Laterality Date    arm surgery       SECTION      CHOLECYSTECTOMY      ELBOW SURGERY      SHOULDER SURGERY Right     arm repair       Social History     Socioeconomic History    Marital status:     Number of children: 9    Highest education level: Some college, no degree   Occupational History    Occupation: retired      Comment: , housekeeping, deli    Tobacco Use    Smoking status: Never    Smokeless tobacco: Never   Substance and Sexual Activity    Alcohol use: No    Drug use: No    Sexual activity: Not Currently   Social History Narrative    ** Merged History Encounter **         Lives in [address] in [place]  Now guardian of 4 grandchildren   Mother of 9 children - 2 are .   Irma: nondenomination   Has a lot of family support          Review of patient's allergies indicates:  No Known Allergies    Current Outpatient Medications on File Prior to Visit   Medication Sig Dispense Refill    albuterol (PROVENTIL) 2.5 mg /3 mL (0.083 %) nebulizer solution Take 2.5 mg by nebulization every 6 (six) hours as needed for Wheezing. Rescue      albuterol (PROVENTIL/VENTOLIN HFA) 90 mcg/actuation inhaler Inhale 2 puffs into the lungs every 4 (four) hours as needed for Wheezing. Rescue      [DISCONTINUED] cyclobenzaprine (FLEXERIL) 5 MG tablet Take 1-2 tablets (5-10 mg total) by mouth 3 (three) times daily as needed for Muscle spasms. (Patient not  taking: No sig reported) 50 tablet 1    [DISCONTINUED] naproxen (NAPROSYN) 500 MG tablet Take 1 tablet (500 mg total) by mouth 2 (two) times daily with meals. (Patient not taking: No sig reported) 60 tablet 1    [DISCONTINUED] triamcinolone acetonide (KENALOG-40) 40 mg/mL injection        No current facility-administered medications on file prior to visit.       Objective:      BP (!) 142/77 (BP Location: Left arm, Patient Position: Sitting, BP Method: Large (Automatic))   Pulse (!) 55   Ht 5' (1.524 m)   Wt 132.6 kg (292 lb 3.5 oz)   SpO2 100%   BMI 57.07 kg/m²     Exam:  GEN:  Well developed, well nourished.  No acute distress.   HEENT:  No trauma.  Mucous membranes moist.  Nares patent bilaterally.  PSYCH: Normal affect. Thought content appropriate.  CHEST:  Breathing symmetric.  No audible wheezing.  ABD: Soft, non-distended.  SKIN:  Warm, pink, dry.  No rash on exposed areas.    EXT:  No cyanosis, clubbing, or edema.  No color change or changes in nail or hair growth.  NEURO/MUSCULOSKELETAL:  Fully alert, oriented, and appropriate. Speech normal magui. No cranial nerve deficits.   Gait:  Antalgic.  Ambulates with a Rollator.  No focal motor deficits.     No gross warmth, swelling, erythema to bilateral knees   Full active range of motion of both knees   No joint laxity or instability noted      Imaging:    Narrative & Impression    EXAMINATION:  XR KNEE ORTHO RIGHT WITH FLEXION     CLINICAL HISTORY:  Pain in right knee     TECHNIQUE:  AP standing as well as PA flexion standing and Merchant views of both knees were performed.  A lateral view of the right knee is also performed.     COMPARISON:  Right knee radiograph dated 04/22/2022     FINDINGS:  There is tricompartmental degenerative change of the right knee with marginal osteophyte production.  Variable joint space narrowing, most pronounced and advanced at the medial femorotibial compartment with associated varus angulation.  Similar, advanced  femorotibial joint space narrowing of the left knee medial compartment with varus angulation.  No acute fracture, dislocation, or osseous destruction.  Scattered vascular atherosclerosis.     Impression:     As above.        Electronically signed by: Yoni Quijano  Date:                                            05/17/2022  Time:                                           12:43       Assessment:       Encounter Diagnoses   Name Primary?    Chronic pain of right knee     Primary osteoarthritis of right knee          Plan:       Dea was seen today for knee pain.    Diagnoses and all orders for this visit:    Chronic pain of right knee    Primary osteoarthritis of right knee      Dea Newton is a 67 y.o. female with chronic right knee pain secondary to osteoarthritis.  Has failed intra-articular steroid injections.  Not a candidate for knee replacement secondary to BMI..    Pertinent imaging studies reviewed by me. Imaging results were discussed with patient.  Schedule for right genicular nerve blocks x2 under fluoroscopy.  If diagnostic can proceed with cooled radiofrequency ablations.    Return to clinic after procedure to discuss results.            This note was created by combination of typed  and M-Modal dictation. Transcription and phonetic errors may be present.  If there are any questions, please contact me.

## 2022-09-22 NOTE — PATIENT INSTRUCTIONS
Reviewed pre op instructions with patient:    Please leave jewelry and valuables at home or give them to your escort for safe keeping.  You will be required to have an adult to escort you after the procedure is over. Although we will not be sedating you for your procedure we ask for an escort for safety after the procedure.  Do not take over the counter blood thinning medications beginning 7 days before the procedure (aspirin, Motrin, ibuprofen, Advil, Aleve, naproxen). If you are taking prescribed thinners (Coumadin, warfarin, apixaban, Eliquis, Plavix, clopidogrel, Pletal, etc) we will obtain cardiac clearance from your cardiologist or provider that is monitoring your medications and levels to hold the medications if appropriate.  Cleanse your skin the evening before with an antibacterial soap (Dial or Hibiclens) and then repeat it again the morning of the procedure.  Do not apply lotions, creams, deodorants, perfumes, or colognes the day of the procedure.  You will need to have your COVID testing done on the Monday before the scheduled procedure if you have not been vaccinated.  You will be contacted the day before the procedure to be given the time to arrive the day of the procedure. If you are not contacted by the afternoon before the procedure you should contact [phone].  Please do not eat or drink after midnight before the procedure.    Patient verbalized understanding of the instructions provided to them and clinic contact number was provided for any further questions they may have.

## 2022-09-26 DIAGNOSIS — I73.9 PAD (PERIPHERAL ARTERY DISEASE): Primary | ICD-10-CM

## 2022-09-26 DIAGNOSIS — I50.30 DIASTOLIC HEART FAILURE, UNSPECIFIED HF CHRONICITY: Primary | ICD-10-CM

## 2022-09-27 ENCOUNTER — PATIENT MESSAGE (OUTPATIENT)
Dept: PRIMARY CARE CLINIC | Facility: CLINIC | Age: 67
End: 2022-09-27
Payer: MEDICARE

## 2022-10-19 ENCOUNTER — PES CALL (OUTPATIENT)
Dept: ADMINISTRATIVE | Facility: CLINIC | Age: 67
End: 2022-10-19
Payer: MEDICARE

## 2023-08-07 ENCOUNTER — PATIENT OUTREACH (OUTPATIENT)
Dept: ADMINISTRATIVE | Facility: HOSPITAL | Age: 68
End: 2023-08-07
Payer: MEDICARE

## 2023-08-07 NOTE — PROGRESS NOTES
Population Health Chart Review & Patient Outreach Details:     Reason for Outreach Encounter:     []  Non-Compliant Report   []  Payor Report ([employer], PHN, BCBS, MSSP, MCIP, UHC, etc.)   []  Pre-Visit Chart Review     Updates Requested / Reviewed:     [x]  Care Everywhere    [x]     []  External Sources ([facility], [facility], DIS, etc.)   [x]  Care Team Updated    Patient Outreach Method:    [x]  Telephone Outreach Completed   [x] Successful   [] Left Voicemail   [] Unable to Contact (wrong number, no voicemail)  []  [employer] Outreach Sent  []  Letter Outreach Mailed  []  Fax Sent for External Records  []  External Records Upload    Health Maintenance Topics Addressed and Outreach Outcomes / Actions Taken:        []      Breast Cancer Screening []  Mammo Scheduled      []  External Records Requested     []  Added Reminder to Complete to Upcoming Primary Care Appt Notes     []  Patient Declined     []  Patient Will Call Back to Schedule     []  Patient Will Schedule with External Provider / Order Routed if Applicable             []       Cervical Cancer Screening []  Pap Scheduled      []  External Records Requested     []  Added Reminder to Complete to Upcoming Primary Care Appt Notes     []  Patient Declined     []  Patient Will Call Back to Schedule     []  Patient Will Schedule with External Provider               []          Colorectal Cancer Screening []  Colonoscopy Case Request or Referral Placed     []  External Records Requested     []  Added Reminder to Complete to Upcoming Primary Care Appt Notes     []  Patient Declined     []  Patient Will Call Back to Schedule     []  Patient Will Schedule with External Provider     []  Fit Kit Mailed (add the SmartPhrase under additional notes)     []  Reminded Patient to Complete Home Test             []      Diabetic Eye Exam []  Eye Camera Scheduled or Optometry Referral Placed     []  External Records Requested     []  Added Reminder to Complete to  Upcoming Primary Care Appt Notes     []  Patient Declined     []  Patient Will Call Back to Schedule     []  Patient Will Schedule with External Provider             []      Blood Pressure Control []  Primary Care Follow Up Visit Scheduled     []  Remote Blood Pressure Reading Captured     []  Added Reminder to Complete to Upcoming Primary Care Appt Notes     []  Patient Declined     []  Patient Will Call Back / Patient Will Send Portal Message with Reading     []  Patient Will Call Back to Schedule Provider Visit             []       HbA1c & Other Labs []  Lab Appt Scheduled for Due Labs     []  Primary Care Follow Up Visit Scheduled      []  Reminded Patient to Complete Home Test     []  Added Reminder to Complete to Upcoming Primary Care Appt Notes     []  Patient Declined     []  Patient Will Call Back to Schedule     []  Patient Will Schedule with External Provider / Order Routed if Applicable           [x]    Schedule Primary Care Appt []  Primary Care Appt Scheduled     [x]  Patient Declined     []  Patient Will Call Back to Schedule     []  Pt Established with External Provider & Updated Care Team             []      Medication Adherence []  Primary Care Appointment Scheduled     []  Added Reminder to Upcoming Primary Care Appt Notes     []  Patient Reminded to  Prescription     []  Patient Declined, Provider Notified if Needed     []  Sent Provider Message to Review and/or Add Exclusion to Problem List             []      Osteoporosis Screening []  DXA Appointment Scheduled     []  External Records Requested     []  Added Reminder to Complete to Upcoming Primary Care Appt Notes     []  Patient Declined     []  Patient Will Call Back to Schedule     []  Patient Will Schedule with External Provider / Order Routed if Applicable     Additional Care Coordinator Notes:     The patient states that she will look for a new PCP on her own.     Further Action Needed If Patient Returns Outreach:

## 2024-01-12 ENCOUNTER — HOSPITAL ENCOUNTER (OUTPATIENT)
Dept: RADIOLOGY | Facility: HOSPITAL | Age: 69
Discharge: HOME | End: 2024-01-12
Attending: INTERNAL MEDICINE
Payer: MEDICARE

## 2024-01-12 DIAGNOSIS — Z12.31 BREAST CANCER SCREENING BY MAMMOGRAM: ICD-10-CM

## 2024-01-12 PROCEDURE — 77063 BREAST TOMOSYNTHESIS BI: CPT | Mod: 26,,, | Performed by: RADIOLOGY

## 2024-01-12 PROCEDURE — 77067 SCR MAMMO BI INCL CAD: CPT | Mod: TC

## 2024-01-12 PROCEDURE — 77067 SCR MAMMO BI INCL CAD: CPT | Mod: 26,,, | Performed by: RADIOLOGY
